# Patient Record
Sex: FEMALE | Race: BLACK OR AFRICAN AMERICAN | Employment: OTHER | URBAN - METROPOLITAN AREA
[De-identification: names, ages, dates, MRNs, and addresses within clinical notes are randomized per-mention and may not be internally consistent; named-entity substitution may affect disease eponyms.]

---

## 2017-06-30 ENCOUNTER — TELEPHONE (OUTPATIENT)
Dept: FAMILY MEDICINE CLINIC | Facility: CLINIC | Age: 72
End: 2017-06-30

## 2017-08-09 ENCOUNTER — HOSPITAL ENCOUNTER (OUTPATIENT)
Dept: LAB | Age: 72
Discharge: HOME | End: 2017-08-09
Payer: MEDICARE

## 2017-08-09 ENCOUNTER — OFFICE VISIT (OUTPATIENT)
Dept: FAMILY MEDICINE CLINIC | Facility: CLINIC | Age: 72
End: 2017-08-09

## 2017-08-09 VITALS
HEIGHT: 66 IN | WEIGHT: 195 LBS | DIASTOLIC BLOOD PRESSURE: 104 MMHG | SYSTOLIC BLOOD PRESSURE: 179 MMHG | BODY MASS INDEX: 31.34 KG/M2 | RESPIRATION RATE: 15 BRPM | OXYGEN SATURATION: 99 % | TEMPERATURE: 96.9 F | HEART RATE: 72 BPM

## 2017-08-09 DIAGNOSIS — N18.30 KIDNEY DISEASE, CHRONIC, STAGE III (GFR 30-59 ML/MIN) (HCC): ICD-10-CM

## 2017-08-09 DIAGNOSIS — Z71.89 ADVANCED CARE PLANNING/COUNSELING DISCUSSION: ICD-10-CM

## 2017-08-09 DIAGNOSIS — Z13.5 SCREENING FOR GLAUCOMA: ICD-10-CM

## 2017-08-09 DIAGNOSIS — R73.09 ELEVATED HEMOGLOBIN A1C: ICD-10-CM

## 2017-08-09 DIAGNOSIS — Z00.00 MEDICARE ANNUAL WELLNESS VISIT, SUBSEQUENT: Primary | ICD-10-CM

## 2017-08-09 DIAGNOSIS — Z23 ENCOUNTER FOR IMMUNIZATION: ICD-10-CM

## 2017-08-09 DIAGNOSIS — G62.9 NEUROPATHY: ICD-10-CM

## 2017-08-09 DIAGNOSIS — I10 BENIGN ESSENTIAL HTN: ICD-10-CM

## 2017-08-09 DIAGNOSIS — Z13.31 SCREENING FOR DEPRESSION: ICD-10-CM

## 2017-08-09 LAB
ALBUMIN SERPL BCP-MCNC: 4.2 G/DL (ref 3.4–5)
ALBUMIN/GLOB SERPL: 1.1 {RATIO} (ref 0.8–1.7)
ALP SERPL-CCNC: 107 U/L (ref 45–117)
ALT SERPL-CCNC: 29 U/L (ref 13–56)
ANION GAP BLD CALC-SCNC: 9 MMOL/L (ref 3–18)
AST SERPL W P-5'-P-CCNC: 32 U/L (ref 15–37)
BASOPHILS # BLD AUTO: 0 K/UL (ref 0–0.06)
BASOPHILS # BLD: 0 % (ref 0–2)
BILIRUB SERPL-MCNC: 0.6 MG/DL (ref 0.2–1)
BUN SERPL-MCNC: 14 MG/DL (ref 7–18)
BUN/CREAT SERPL: 12 (ref 12–20)
CALCIUM SERPL-MCNC: 9.5 MG/DL (ref 8.5–10.1)
CHLORIDE SERPL-SCNC: 106 MMOL/L (ref 100–108)
CHOLEST SERPL-MCNC: 219 MG/DL
CO2 SERPL-SCNC: 25 MMOL/L (ref 21–32)
CREAT SERPL-MCNC: 1.19 MG/DL (ref 0.6–1.3)
DIFFERENTIAL METHOD BLD: ABNORMAL
EOSINOPHIL # BLD: 0.1 K/UL (ref 0–0.4)
EOSINOPHIL NFR BLD: 1 % (ref 0–5)
ERYTHROCYTE [DISTWIDTH] IN BLOOD BY AUTOMATED COUNT: 16.3 % (ref 11.6–14.5)
EST. AVERAGE GLUCOSE BLD GHB EST-MCNC: 126 MG/DL
GLOBULIN SER CALC-MCNC: 3.7 G/DL (ref 2–4)
GLUCOSE SERPL-MCNC: 97 MG/DL (ref 74–99)
HBA1C MFR BLD: 6 % (ref 4.2–5.6)
HCT VFR BLD AUTO: 40.2 % (ref 35–45)
HDLC SERPL-MCNC: 80 MG/DL (ref 40–60)
HDLC SERPL: 2.7 {RATIO} (ref 0–5)
HGB BLD-MCNC: 12.5 G/DL (ref 12–16)
LDLC SERPL CALC-MCNC: 126 MG/DL (ref 0–100)
LIPID PROFILE,FLP: ABNORMAL
LYMPHOCYTES # BLD AUTO: 34 % (ref 21–52)
LYMPHOCYTES # BLD: 3.1 K/UL (ref 0.9–3.6)
MCH RBC QN AUTO: 26.4 PG (ref 24–34)
MCHC RBC AUTO-ENTMCNC: 31.1 G/DL (ref 31–37)
MCV RBC AUTO: 85 FL (ref 74–97)
MONOCYTES # BLD: 0.5 K/UL (ref 0.05–1.2)
MONOCYTES NFR BLD AUTO: 6 % (ref 3–10)
NEUTS SEG # BLD: 5.4 K/UL (ref 1.8–8)
NEUTS SEG NFR BLD AUTO: 59 % (ref 40–73)
PLATELET # BLD AUTO: 203 K/UL (ref 135–420)
PMV BLD AUTO: 12.7 FL (ref 9.2–11.8)
POTASSIUM SERPL-SCNC: 4.2 MMOL/L (ref 3.5–5.5)
PROT SERPL-MCNC: 7.9 G/DL (ref 6.4–8.2)
RBC # BLD AUTO: 4.73 M/UL (ref 4.2–5.3)
SODIUM SERPL-SCNC: 140 MMOL/L (ref 136–145)
T4 FREE SERPL-MCNC: 1.2 NG/DL (ref 0.7–1.5)
TRIGL SERPL-MCNC: 65 MG/DL (ref ?–150)
TSH SERPL DL<=0.05 MIU/L-ACNC: 1.48 UIU/ML (ref 0.36–3.74)
VLDLC SERPL CALC-MCNC: 13 MG/DL
WBC # BLD AUTO: 9.1 K/UL (ref 4.6–13.2)

## 2017-08-09 PROCEDURE — 36415 COLL VENOUS BLD VENIPUNCTURE: CPT | Performed by: INTERNAL MEDICINE

## 2017-08-09 PROCEDURE — 83036 HEMOGLOBIN GLYCOSYLATED A1C: CPT | Performed by: INTERNAL MEDICINE

## 2017-08-09 PROCEDURE — 85025 COMPLETE CBC W/AUTO DIFF WBC: CPT | Performed by: INTERNAL MEDICINE

## 2017-08-09 PROCEDURE — 82306 VITAMIN D 25 HYDROXY: CPT | Performed by: INTERNAL MEDICINE

## 2017-08-09 PROCEDURE — 84439 ASSAY OF FREE THYROXINE: CPT | Performed by: INTERNAL MEDICINE

## 2017-08-09 PROCEDURE — 84443 ASSAY THYROID STIM HORMONE: CPT | Performed by: INTERNAL MEDICINE

## 2017-08-09 PROCEDURE — 80053 COMPREHEN METABOLIC PANEL: CPT | Performed by: INTERNAL MEDICINE

## 2017-08-09 PROCEDURE — 80061 LIPID PANEL: CPT | Performed by: INTERNAL MEDICINE

## 2017-08-09 RX ORDER — LOSARTAN POTASSIUM 100 MG/1
100 TABLET ORAL DAILY
Qty: 90 TAB | Refills: 3 | Status: SHIPPED | OUTPATIENT
Start: 2017-08-09 | End: 2019-03-12 | Stop reason: SDUPTHER

## 2017-08-09 RX ORDER — METOPROLOL SUCCINATE 25 MG/1
25 TABLET, EXTENDED RELEASE ORAL DAILY
Qty: 90 TAB | Refills: 3 | Status: SHIPPED | OUTPATIENT
Start: 2017-08-09 | End: 2017-12-27

## 2017-08-09 RX ORDER — PREGABALIN 75 MG/1
75 CAPSULE ORAL 2 TIMES DAILY
Qty: 180 CAP | Refills: 3 | Status: SHIPPED | OUTPATIENT
Start: 2017-08-09 | End: 2019-03-11

## 2017-08-09 NOTE — PATIENT INSTRUCTIONS
Schedule of Personalized Health Plan  (Provide Copy to Patient)  The best way to stay healthy is to live a healthy lifestyle. A healthy lifestyle includes regular exercise, eating a well-balanced diet, keeping a healthy weight and not smoking. Regular physical exams and screening tests are another important way to take care of yourself. Preventive exams provided by health care providers can find health problems early when treatment works best and can keep you from getting certain diseases or illnesses. Preventive services include exams, lab tests, screenings, shots, monitoring and information to help you take care of your own health. All people over 65 should have a pneumonia shot. Pneumonia shots are usually only needed once in a lifetime unless your doctor decides differently. All people over 65 should have a yearly flu shot. People over 65 are at medium to high risk for Hepatitis B. Three shots are needed for complete protection. In addition to your physical exam, some screening tests are recommended:    Bone mass measurement (dexa scan) is recommended every two years  Diabetes Mellitus screening is recommended every year. Glaucoma is an eye disease caused by high pressure in the eye. An eye exam is recommended every year. Cardiovascular screening tests that check your cholesterol and other blood fat (lipid) levels are recommended every five years. Colorectal Cancer screening tests help to find pre-cancerous polyps (growths in the colon) so they can be removed before they turn into cancer. Tests ordered for screening depend on your personal and family history risk factors.     Screening for Breast Cancer is recommended yearly with a mammogram.    Screening for Cervical Cancer is recommended every two years (annually for certain risk factors, such as previous history of STD or abnormal PAP in past 7 years), with a Pelvic Exam with PAP    Here is a list of your current Health Maintenance items with a due date:  Health Maintenance   Topic Date Due    ZOSTER VACCINE AGE 60>  10/17/2005    GLAUCOMA SCREENING Q2Y  12/17/2010    Pneumococcal 65+ Low/Medium Risk (2 of 2 - PPSV23) 12/30/2016    MEDICARE YEARLY EXAM  12/30/2016    INFLUENZA AGE 9 TO ADULT  08/01/2017    BREAST CANCER SCRN MAMMOGRAM  10/19/2018    DTaP/Tdap/Td series (2 - Td) 12/30/2025    COLONOSCOPY  01/13/2026    Hepatitis C Screening  Completed    OSTEOPOROSIS SCREENING (DEXA)  Completed

## 2017-08-09 NOTE — PROGRESS NOTES
Angelina Purvis is a 70 y.o. female and presents for annual Medicare Wellness Visit. Problem List: Reviewed with patient and discussed risk factors. Patient Active Problem List   Diagnosis Code    Benign essential HTN I10    Advance directive discussed with patient Z71.89    Obesity, Class I, BMI 30-34.9 E66.9    Sciatica of left side M54.32    Post herpetic neuralgia B02.29    Memory loss R41.3    Acute midline low back pain with left-sided sciatica M54.42    Pain of left hip joint M25.552    Osteopenia M85.80    Kidney disease, chronic, stage III (GFR 30-59 ml/min) N18.3    Elevated hemoglobin A1c R73.09       Current medical providers:  Patient Care Team:  Luis Kinsey MD as PCP - General (Internal Medicine)  Fadi Lewis MD (Surgery)    PSH: Reviewed with patient  Past Surgical History:   Procedure Laterality Date    APPENDECTOMY      CHOLECYSTOENTEROSTOMY      HX TONSIL AND ADENOIDECTOMY          SH: Reviewed with patient  Social History   Substance Use Topics    Smoking status: Never Smoker    Smokeless tobacco: Never Used    Alcohol use Yes      Comment: socially       FH: Reviewed with patient  Family History   Problem Relation Age of Onset    Hypertension Sister     Breast Cancer Sister        Medications/Allergies: Reviewed with patient  Current Outpatient Prescriptions on File Prior to Visit   Medication Sig Dispense Refill    chlorthalidone (HYGROTEN) 25 mg tablet Take 1 Tab by mouth daily. (Patient taking differently: Take 25 mg by mouth every other day.) 90 Tab 3    multivitamin (ONE A DAY) tablet Take 1 Tab by mouth daily.  omega-3 fatty acids-vitamin e (FISH OIL) 1,000 mg cap Take 1 Cap by mouth. No current facility-administered medications on file prior to visit.        Allergies   Allergen Reactions    Amlodipine Rash       Objective:  Visit Vitals    BP (!) 179/104 (BP 1 Location: Left arm, BP Patient Position: Sitting)  Comment: manual    Pulse 72    Temp 96.9 °F (36.1 °C) (Oral)    Resp 15    Ht 5' 6\" (1.676 m)    Wt 195 lb (88.5 kg)    SpO2 99%    BMI 31.47 kg/m2    Body mass index is 31.47 kg/(m^2). Assessment of cognitive impairment: Alert and oriented x 3  Depression Screen:   PHQ over the last two weeks 8/9/2017   Little interest or pleasure in doing things Not at all   Feeling down, depressed or hopeless Nearly every day   Total Score PHQ 2 3     Fall Risk Assessment:    Fall Risk Assessment, last 12 mths 8/9/2017   Able to walk? Yes   Fall in past 12 months? Yes   Fall with injury? Yes   Number of falls in past 12 months 4   Fall Risk Score 5     Functional Ability:   Does the patient exhibit a steady gait? yes   How long did it take the patient to get up and walk from a sitting position? 7 seconds   Is the patient self reliant?  (ie can do own laundry, meals, household chores)  yes   Does the patient handle his/her own medications? yes   Does the patient handle his/her own money? yes   Is the patients home safe (ie good lighting, handrails on stairs and bath, etc.)? yes   Did you notice or did patient express any hearing difficulties? no   Did you notice of did patient express any vision difficulties? Yes. Will see eye dr   Were distance and reading eye charts used?   yes     Advance Care Planning:   Patient was offered the opportunity to discuss advance care planning:  yes     Does patient have an Advance Directive:  yes   If no, did you provide information on Caring Connections?  no     Plan:    Orders Placed This Encounter    ADVANCE CARE PLANNING FIRST 30 MINS    Depression Screen Annual    CBC WITH AUTOMATED DIFF    LIPID PANEL    METABOLIC PANEL, COMPREHENSIVE    TSH 3RD GENERATION    T4, FREE    HEMOGLOBIN A1C WITH EAG    VITAMIN D, 25 HYDROXY    REFERRAL TO OPHTHALMOLOGY    REFERRAL TO NEUROLOGY    pneumococcal 23-valent (PNEUMOVAX 23) 25 mcg/0.5 mL injection    varicella zoster vacine live (VARICELLA-ZOSTER VACINE LIVE) 19,400 unit/0.65 mL susr injection    losartan (COZAAR) 100 mg tablet    metoprolol succinate (TOPROL-XL) 25 mg XL tablet    pregabalin (LYRICA) 75 mg capsule     Health Maintenance   Topic Date Due    ZOSTER VACCINE AGE 60>  10/17/2005    GLAUCOMA SCREENING Q2Y  2010    Pneumococcal 65+ Low/Medium Risk (2 of 2 - PPSV23) 2016    MEDICARE YEARLY EXAM  2016    INFLUENZA AGE 9 TO ADULT  2017    BREAST CANCER SCRN MAMMOGRAM  10/19/2018    DTaP/Tdap/Td series (2 - Td) 2025    COLONOSCOPY  2026    Hepatitis C Screening  Completed    OSTEOPOROSIS SCREENING (DEXA)  Completed     Time spent counseling pt on advanced care plannin minutes    *Patient verbalized understanding and agreement with the plan. A copy of the After Visit Summary with personalized health plan was given to the patient today. Follow-up Disposition:  Return in about 1 week (around 2017) for Follow up hypertension. Betty Jovel.  [address] eL MD - Internal Medicine  2017, 2:33 PM  [facility]  [address] [place], [address]  Phone [phone]  Fax [phone]

## 2017-08-09 NOTE — PROGRESS NOTES
Internal Medicine Progress Note    Today's Date:  2017   Patient:  Glenna Pollack  Patient :  1945    Subjective:     Chief Complaint   Patient presents with    Hypertension    Back Pain    Hip Pain    Obesity    Annual Wellness Visit      Hypertension   This is a chronic problem. BP is not at goal. Pt is on losartan and chlorthalidone. She is compliant with these medications. Back and hip pain/Neuropathy  This is a chronic problem. This is not at goal. This has been present for one year. Xrays were done which showed arthritis. Neurontin is not effective. Chronic kidney disease  This is a chronic problem. This is not at goal. Creatinine was last checked on 10/2016. GFR is 57. Past Medical History:   Diagnosis Date    Acute midline low back pain with left-sided sciatica 10/5/2016    Benign essential HTN 2015    CKD (chronic kidney disease), stage III 2015    Elevated hemoglobin A1c 2016    Kidney disease, chronic, stage III (GFR 30-59 ml/min) 2016    Memory loss 2016    Obesity, Class I, BMI 30-34.9 2015    Osteopenia 10/19/2016    Pain of left hip joint 10/5/2016    Post herpetic neuralgia 2015    Sciatica of left side 2015     Past Surgical History:   Procedure Laterality Date    APPENDECTOMY      CHOLECYSTOENTEROSTOMY      HX TONSIL AND ADENOIDECTOMY        reports that she has never smoked. She has never used smokeless tobacco. She reports that she drinks alcohol. She reports that she does not use illicit drugs.   Family History   Problem Relation Age of Onset    Hypertension Sister     Breast Cancer Sister      Allergies   Allergen Reactions    Amlodipine Rash     Review of Systems   Positives in bold  CV:      chest pain, palpitations  PULM:  SOB, wheezing, cough, sputum production    Current Outpatient Meds and Allergies     Current Outpatient Prescriptions on File Prior to Visit   Medication Sig Dispense Refill    chlorthalidone (HYGROTEN) 25 mg tablet Take 1 Tab by mouth daily. (Patient taking differently: Take 25 mg by mouth every other day.) 90 Tab 3    multivitamin (ONE A DAY) tablet Take 1 Tab by mouth daily.  omega-3 fatty acids-vitamin e (FISH OIL) 1,000 mg cap Take 1 Cap by mouth. No current facility-administered medications on file prior to visit. Allergies   Allergen Reactions    Amlodipine Rash     Objective:     VS:    Visit Vitals    BP (!) 179/104 (BP 1 Location: Left arm, BP Patient Position: Sitting)  Comment: manual    Pulse 72    Temp 96.9 °F (36.1 °C) (Oral)    Resp 15    Ht 5' 6\" (1.676 m)    Wt 195 lb (88.5 kg)    SpO2 99%    BMI 31.47 kg/m2     General:   Well-nourished, well-groomed, pleasant, alert, in no acute distress  Head:  Normocephalic, atraumatic  Ears:  External ears WNL  Nose:  External nares WNL  Psych:  No pressured speech, no abnormal thought content    No visits with results within 3 Month(s) from this visit. Latest known visit with results is:    Hospital Outpatient Visit on 10/12/2016   Component Date Value Ref Range Status    WBC 10/12/2016 8.9  4.6 - 13.2 K/uL Final    RBC 10/12/2016 4.62  4.20 - 5.30 M/uL Final    HGB 10/12/2016 13.3  12.0 - 16.0 g/dL Final    HCT 10/12/2016 41.8  35.0 - 45.0 % Final    MCV 10/12/2016 90.5  74.0 - 97.0 FL Final    MCH 10/12/2016 28.8  24.0 - 34.0 PG Final    MCHC 10/12/2016 31.8  31.0 - 37.0 g/dL Final    RDW 10/12/2016 14.8* 11.6 - 14.5 % Final    PLATELET 30/85/2445 254  135 - 420 K/uL Final    MPV 10/12/2016 13.1* 9.2 - 11.8 FL Final    NEUTROPHILS 10/12/2016 64  40 - 73 % Final    LYMPHOCYTES 10/12/2016 29  21 - 52 % Final    MONOCYTES 10/12/2016 6  3 - 10 % Final    EOSINOPHILS 10/12/2016 1  0 - 5 % Final    BASOPHILS 10/12/2016 0  0 - 2 % Final    ABS. NEUTROPHILS 10/12/2016 5.6  1.8 - 8.0 K/UL Final    ABS. LYMPHOCYTES 10/12/2016 2.6  0.9 - 3.6 K/UL Final    ABS.  MONOCYTES 10/12/2016 0.5  0.05 - 1.2 K/UL Final    ABS. EOSINOPHILS 10/12/2016 0.1  0.0 - 0.4 K/UL Final    ABS. BASOPHILS 10/12/2016 0.0  0.0 - 0.06 K/UL Final    DF 10/12/2016 AUTOMATED    Final    T4, Free 10/12/2016 1.2  0.7 - 1.5 NG/DL Final    LIPID PROFILE 10/12/2016        Final    Cholesterol, total 10/12/2016 180  <200 MG/DL Final    Triglyceride 10/12/2016 73  <150 MG/DL Final    HDL Cholesterol 10/12/2016 66* 40 - 60 MG/DL Final    LDL, calculated 10/12/2016 99.4  0 - 100 MG/DL Final    VLDL, calculated 10/12/2016 14.6  MG/DL Final    CHOL/HDL Ratio 10/12/2016 2.7  0 - 5.0   Final    Sodium 10/12/2016 139  136 - 145 mmol/L Final    Potassium 10/12/2016 4.0  3.5 - 5.5 mmol/L Final    Chloride 10/12/2016 103  100 - 108 mmol/L Final    CO2 10/12/2016 28  21 - 32 mmol/L Final    Anion gap 10/12/2016 8  3.0 - 18 mmol/L Final    Glucose 10/12/2016 92  74 - 99 mg/dL Final    BUN 10/12/2016 18  7.0 - 18 MG/DL Final    Creatinine 10/12/2016 1.14  0.6 - 1.3 MG/DL Final    BUN/Creatinine ratio 10/12/2016 16  12 - 20   Final    GFR est AA 10/12/2016 57* >60 ml/min/1.73m2 Final    GFR est non-AA 10/12/2016 47* >60 ml/min/1.73m2 Final    Comment: (NOTE)  Estimated GFR is calculated using the Modification of Diet in Renal   Disease (MDRD) Study equation, reported for both  Americans   (GFRAA) and non- Americans (GFRNA), and normalized to 1.73m2   body surface area. The physician must decide which value applies to   the patient. The MDRD study equation should only be used in   individuals age 25 or older. It has not been validated for the   following: pregnant women, patients with serious comorbid conditions,   or on certain medications, or persons with extremes of body size,   muscle mass, or nutritional status.       Calcium 10/12/2016 9.8  8.5 - 10.1 MG/DL Final    Bilirubin, total 10/12/2016 0.8  0.2 - 1.0 MG/DL Final    ALT (SGPT) 10/12/2016 25  13 - 56 U/L Final    AST (SGOT) 10/12/2016 27  15 - 37 U/L Final    Alk. phosphatase 10/12/2016 95  45 - 117 U/L Final    Protein, total 10/12/2016 8.1  6.4 - 8.2 g/dL Final    Albumin 10/12/2016 4.2  3.4 - 5.0 g/dL Final    Globulin 10/12/2016 3.9  2.0 - 4.0 g/dL Final    A-G Ratio 10/12/2016 1.1  0.8 - 1.7   Final    TSH 10/12/2016 1.07  0.36 - 3.74 uIU/mL Final    Hemoglobin A1c 10/12/2016 5.7* 4.2 - 5.6 % Final    Comment: (NOTE)  HbA1C Interpretive Ranges  <5.7              Normal  5.7 - 6.4         Consider Prediabetes  >6.5              Consider Diabetes      Est. average glucose 10/12/2016 117  mg/dL Final    Comment: (NOTE)  The eAG should be interpreted with patient characteristics in mind   since ethnicity, interindividual differences, red cell lifespan,   variation in rates of glycation, etc. may affect the validity of the   calculation.  Vitamin D 25-Hydroxy 10/12/2016 37.1  30 - 100 ng/mL Final    Comment: (NOTE)  Deficiency               <20 ng/mL  Insufficiency          20-30 ng/mL  Sufficient             ng/mL  Possible toxicity       >100 ng/mL    The Method used is Siemens Advia Centaur currently standardized to a   Center of Disease Control and Prevention (CDC) certified reference   [address]. Samples containing fluorescein dye can produce falsely   elevated values when tested with the ADVIA Centaur Vitamin D Assay. It is recommended that results in the toxic range, >100 ng/mL, be   retested 72 hours post fluorescein exposure. Assessment/Plan & Orders:         ICD-10-CM ICD-9-CM    1. Medicare annual wellness visit, subsequent Z00.00 V70.0    2.  Benign essential HTN I10 401.1 losartan (COZAAR) 100 mg tablet      CBC WITH AUTOMATED DIFF      LIPID PANEL      METABOLIC PANEL, COMPREHENSIVE      TSH 3RD GENERATION      T4, FREE      metoprolol succinate (TOPROL-XL) 25 mg XL tablet      CBC WITH AUTOMATED DIFF      LIPID PANEL      METABOLIC PANEL, COMPREHENSIVE      TSH 3RD GENERATION      T4, FREE   3. Elevated hemoglobin A1c R73.09 790.29 HEMOGLOBIN A1C WITH EAG      HEMOGLOBIN A1C WITH EAG   4. Kidney disease, chronic, stage III (GFR 30-59 ml/min) N18.3 585.3 VITAMIN D, 25 HYDROXY      VITAMIN D, 25 HYDROXY   5. Neuropathy (HCC) G62.9 355.9 pregabalin (LYRICA) 75 mg capsule      REFERRAL TO NEUROLOGY   6. Screening for glaucoma Z13.5 V80.1 REFERRAL TO OPHTHALMOLOGY   7. Encounter for immunization Z23 V03.89 pneumococcal 23-valent (PNEUMOVAX 23) 25 mcg/0.5 mL injection      varicella zoster vacine live (VARICELLA-ZOSTER VACINE LIVE) 19,400 unit/0.65 mL susr injection   8. Advanced care planning/counseling discussion Z71.89 V65.49 ADVANCE CARE PLANNING FIRST 30 MINS   9. Screening for depression Z13.89 V79.0 Lamont Rivera      Healthy lifestyle has been encouraged including avoidance of tobacco, limiting or avoiding alcohol intake, heart healthy diet which is low in cholesterol and saturated fat and contains fresh fruits, vegetables and whole grains and fiber, regular exercise with goals of 20-30 minutes 3-5 days weekly and maintaining an optimal BMI. Pt to continue chlorthalidone and losartan  Pt states that colonoscopy is up to date. Done 9 yrs ago. She had FOBT testing done. She is unsure of the result. Pt to get eye exam. Form given    Follow-up Disposition:  Return in about 1 week (around 8/16/2017) for Follow up hypertension. *Patient verbalized understanding and agreement with the plan. Patient was given an after-visit summary. Mnadie Couch.  [address], MD - Internal Medicine  8/9/2017, 8:17 AM  [facility]  [address], [address]  Phone [phone]  Fax [phone]

## 2017-08-09 NOTE — ACP (ADVANCE CARE PLANNING)
Pt would like to appoint her niece, Marycarmen Baker, as her medical decision maker in the event that she can no longer do so. Phone number is [phone]. Her secondary person is her brother, Zeus Sylvester. Phone number is [phone]. If her death is imminent and medical treatment will not help her recover, pt does not want life prolonging measures. If her condition makes her unaware of herself or her surroundings and she cannot interact with others, pt does not want life prolonging measures. If there is a chance for recovery, she would like to be kept alive for at least a week. Advance directive scanned in the chart under media.

## 2017-08-09 NOTE — MR AVS SNAPSHOT
Visit Information Date & Time Provider Department Dept. Phone Encounter #  
 8/9/2017  2:00 PM Manuelito Alcaraz MD [facility] [phone] [id number] Follow-up Instructions Return in about 1 week (around 8/16/2017) for Follow up hypertension. Upcoming Health Maintenance Date Due ZOSTER VACCINE AGE 60> 10/17/2005 GLAUCOMA SCREENING Q2Y 12/17/2010 Pneumococcal 65+ Low/Medium Risk (2 of 2 - PPSV23) 12/30/2016 MEDICARE YEARLY EXAM 12/30/2016 INFLUENZA AGE 9 TO ADULT 8/1/2017 BREAST CANCER SCRN MAMMOGRAM 10/19/2018 DTaP/Tdap/Td series (2 - Td) 12/30/2025 COLONOSCOPY 1/13/2026 Allergies as of 8/9/2017  Review Complete On: 8/9/2017 By: Manuelito Alcaraz MD  
  
 Severity Noted Reaction Type Reactions Amlodipine  10/05/2016    Rash Current Immunizations  Never Reviewed Name Date Influenza Vaccine (Quad) PF 10/5/2016 Pneumococcal Conjugate (PCV-13) 12/30/2015  8:30 AM  
 Tdap 12/30/2015  8:32 AM  
  
 Not reviewed this visit You Were Diagnosed With   
  
 Codes Comments Medicare annual wellness visit, subsequent    -  Primary ICD-10-CM: Z00.00 ICD-9-CM: V70.0 Benign essential HTN     ICD-10-CM: I10 
ICD-9-CM: 401.1 Elevated hemoglobin A1c     ICD-10-CM: R73.09 
ICD-9-CM: 790.29 Kidney disease, chronic, stage III (GFR 30-59 ml/min)     ICD-10-CM: N18.3 ICD-9-CM: 439. 3 Neuropathy ([address]     ICD-10-CM: G62.9 ICD-9-CM: 355.9 Screening for glaucoma     ICD-10-CM: Z13.5 ICD-9-CM: V80.1 Encounter for immunization     ICD-10-CM: T59 ICD-9-CM: V03.89 Vitals BP Pulse Temp Resp Height(growth percentile) Weight(growth percentile) (!) 179/104 (BP 1 Location: Left arm, BP Patient Position: Sitting) 72 96.9 °F (36.1 °C) (Oral) 15 5' 6\" (1.676 m) 195 lb (88.5 kg) SpO2 BMI OB Status Smoking Status 99% 31.47 kg/m2 Hysterectomy Never Smoker Vitals History BMI and BSA Data Body Mass Index Body Surface Area  
 31.47 kg/m 2 2.03 m 2 Preferred Pharmacy Pharmacy Name Phone [address], [address] [phone] Your Updated Medication List  
  
   
This list is accurate as of: 17  3:01 PM.  Always use your most recent med list.  
  
  
  
  
 chlorthalidone 25 mg tablet Commonly known as:  Kori [place] Take 1 Tab by mouth daily. FISH OIL 1,000 mg Cap Generic drug:  omega-3 fatty acids-vitamin e Take 1 Cap by mouth.  
  
 losartan 100 mg tablet Commonly known as:  COZAAR Take 1 Tab by mouth daily. metoprolol succinate 25 mg XL tablet Commonly known as:  TOPROL-XL Take 1 Tab by mouth daily. multivitamin tablet Commonly known as:  ONE A DAY Take 1 Tab by mouth daily. pneumococcal 23-valent 25 mcg/0.5 mL injection Commonly known as:  PNEUMOVAX 23  
0.5 mL by IntraMUSCular route once for 1 dose. pregabalin 75 mg capsule Commonly known as:  Arianna Saint Take 1 Cap by mouth two (2) times a day. Max Daily Amount: 150 mg.  
  
 varicella zoster vacine live 19,400 unit/0.65 mL Susr injection Commonly known as:  varicella-zoster vacine live 1 Vial by SubCUTAneous route once for 1 dose. Prescriptions Printed Refills  
 pneumococcal 23-valent (PNEUMOVAX 23) 25 mcg/0.5 mL injection 0 Si.5 mL by IntraMUSCular route once for 1 dose. Class: Print Route: IntraMUSCular  
 varicella zoster vacine live (VARICELLA-ZOSTER VACINE LIVE) 19,400 unit/0.65 mL susr injection 0 Si Vial by SubCUTAneous route once for 1 dose. Class: Print Route: SubCUTAneous  
 pregabalin (LYRICA) 75 mg capsule 3 Sig: Take 1 Cap by mouth two (2) times a day. Max Daily Amount: 150 mg.  
 Class: Print Route: Oral  
  
Prescriptions Sent to Pharmacy Refills  
 losartan (COZAAR) 100 mg tablet 3 Sig: Take 1 Tab by mouth daily.   
 Class: Normal  
 Pharmacy: [address] Ph #: [phone] Route: Oral  
 metoprolol succinate (TOPROL-XL) 25 mg XL tablet 3 Sig: Take 1 Tab by mouth daily. Class: Normal  
 Pharmacy: [facility] [address], [facility] [address] Ph #: [phone] Route: Oral  
  
We Performed the Following REFERRAL TO NEUROLOGY [EWG93 Custom] REFERRAL TO OPHTHALMOLOGY [REF57 Custom] Comments:  
 Please evaluate for Glaucoma Screening. Follow-up Instructions Return in about 1 week (around 8/16/2017) for Follow up hypertension. To-Do List   
 08/09/2017 Lab:  HEMOGLOBIN A1C WITH EAG   
  
 08/09/2017 Lab:  T4, FREE   
  
 08/09/2017 Lab:  TSH 3RD GENERATION   
  
 08/09/2017 Lab:  VITAMIN D, 25 HYDROXY   
  
 08/12/2017 Lab:  CBC WITH AUTOMATED DIFF   
  
 08/12/2017 Lab:  LIPID PANEL   
  
 08/12/2017 Lab:  METABOLIC PANEL, COMPREHENSIVE Referral Information Referral ID Referred By Referred To  
  
 [id number] AllenIrais MD   
   [address] [place] [address] Phone: [phone] Fax: [phone] Visits Status Start Date End Date 1 New Request 8/9/17 8/9/18 If your referral has a status of pending review or denied, additional information will be sent to support the outcome of this decision. Referral ID Referred By Referred To  
 [id number] Hany Carrion MD  
   [id number] [facility] [address] [address] Phone: [phone] Fax: [phone] Visits Status Start Date End Date 1 New Request 8/9/17 8/9/18 If your referral has a status of pending review or denied, additional information will be sent to support the outcome of this decision. Patient Instructions Schedule of Personalized Health Plan (Provide Copy to Patient) The best way to stay healthy is to live a healthy lifestyle. A healthy lifestyle includes regular exercise, eating a well-balanced diet, keeping a healthy weight and not smoking. Regular physical exams and screening tests are another important way to take care of yourself. Preventive exams provided by health care providers can find health problems early when treatment works best and can keep you from getting certain diseases or illnesses. Preventive services include exams, lab tests, screenings, shots, monitoring and information to help you take care of your own health. All people over 65 should have a pneumonia shot. Pneumonia shots are usually only needed once in a lifetime unless your doctor decides differently. All people over 65 should have a yearly flu shot. People over 65 are at medium to high risk for Hepatitis B. Three shots are needed for complete protection. In addition to your physical exam, some screening tests are recommended: 
 
Bone mass measurement (dexa scan) is recommended every two years Diabetes Mellitus screening is recommended every year. Glaucoma is an eye disease caused by high pressure in the eye. An eye exam is recommended every year. Cardiovascular screening tests that check your cholesterol and other blood fat (lipid) levels are recommended every five years. Colorectal Cancer screening tests help to find pre-cancerous polyps (growths in the colon) so they can be removed before they turn into cancer. Tests ordered for screening depend on your personal and family history risk factors. Screening for Breast Cancer is recommended yearly with a mammogram. 
 
Screening for Cervical Cancer is recommended every two years (annually for certain risk factors, such as previous history of STD or abnormal PAP in past 7 years), with a Pelvic Exam with PAP Here is a list of your current Health Maintenance items with a due date: 
Health Maintenance Topic Date Due  
  ZOSTER VACCINE AGE 60>  10/17/2005  GLAUCOMA SCREENING Q2Y  12/17/2010  Pneumococcal 65+ Low/Medium Risk (2 of 2 - PPSV23) 12/30/2016  MEDICARE YEARLY EXAM  12/30/2016  INFLUENZA AGE 9 TO ADULT  08/01/2017  BREAST CANCER SCRN MAMMOGRAM  10/19/2018  DTaP/Tdap/Td series (2 - Td) 12/30/2025  COLONOSCOPY  01/13/2026  Hepatitis C Screening  Completed  OSTEOPOROSIS SCREENING (DEXA)  Completed Introducing [facility]! Dear Mabel Hatch: Thank you for requesting a [employer] account. Our records indicate that you already have an active [employer] account. You can access your account anytime at [email or web address] Did you know that you can access your hospital and ER discharge instructions at any time in [employer]? You can also review all of your test results from your hospital stay or ER visit. Additional Information If you have questions, please visit the Frequently Asked Questions section of the [employer] website at [email or web address] Remember, [employer] is NOT to be used for urgent needs. For medical emergencies, dial 911. Now available from your iPhone and Android! Please provide this summary of care documentation to your next provider. Your primary care clinician is listed as Salty Burrell. If you have any questions after today's visit, please call [phone].

## 2017-08-09 NOTE — PROGRESS NOTES
Messi Healy is a 70 y.o. female presents today for follow up on her Hypertension, Back Pain, Hip Pain and Medicare Wellness. She would also like to discuss her sciatic pain. Patient is Fasting. Pt is in Room # 3        1. Have you been to the ER, urgent care clinic since your last visit? Hospitalized since your last visit? No    2. Have you seen or consulted any other health care providers outside of the [address] [place] [address] since your last visit? Include any pap smears or colon screening. No      reviewed.

## 2017-08-10 ENCOUNTER — TELEPHONE (OUTPATIENT)
Dept: FAMILY MEDICINE CLINIC | Facility: CLINIC | Age: 72
End: 2017-08-10

## 2017-08-10 LAB — 25(OH)D3 SERPL-MCNC: 37 NG/ML (ref 30–100)

## 2017-08-23 ENCOUNTER — OFFICE VISIT (OUTPATIENT)
Dept: FAMILY MEDICINE CLINIC | Facility: CLINIC | Age: 72
End: 2017-08-23

## 2017-08-23 VITALS
RESPIRATION RATE: 16 BRPM | HEIGHT: 66 IN | OXYGEN SATURATION: 98 % | BODY MASS INDEX: 30.92 KG/M2 | TEMPERATURE: 96.3 F | WEIGHT: 192.4 LBS | SYSTOLIC BLOOD PRESSURE: 132 MMHG | DIASTOLIC BLOOD PRESSURE: 74 MMHG | HEART RATE: 77 BPM

## 2017-08-23 DIAGNOSIS — I10 WHITE COAT SYNDROME WITH HYPERTENSION: ICD-10-CM

## 2017-08-23 DIAGNOSIS — E66.9 OBESITY, CLASS I, BMI 30-34.9: ICD-10-CM

## 2017-08-23 DIAGNOSIS — N18.30 KIDNEY DISEASE, CHRONIC, STAGE III (GFR 30-59 ML/MIN) (HCC): ICD-10-CM

## 2017-08-23 DIAGNOSIS — Z23 ENCOUNTER FOR IMMUNIZATION: ICD-10-CM

## 2017-08-23 DIAGNOSIS — R73.03 PREDIABETES: ICD-10-CM

## 2017-08-23 DIAGNOSIS — I10 BENIGN ESSENTIAL HTN: Primary | ICD-10-CM

## 2017-08-23 NOTE — PATIENT INSTRUCTIONS
Vaccine Information Statement    Influenza (Flu) Vaccine (Inactivated or Recombinant): What you need to know    Many Vaccine Information Statements are available in [place] and other languages. See www.immunize.org/vis  Hojas de Información Sobre Vacunas están disponibles en Español y en muchos otros idiomas. Visite www.immunize.org/vis    1. Why get vaccinated? Influenza (flu) is a contagious disease that spreads around the [place] every year, usually between October and May. Flu is caused by influenza viruses, and is spread mainly by coughing, sneezing, and close contact. Anyone can get flu. Flu strikes suddenly and can last several days. Symptoms vary by age, but can include:   fever/chills   sore throat   muscle aches   fatigue   cough   headache    runny or stuffy nose    Flu can also lead to pneumonia and blood infections, and cause diarrhea and seizures in children. If you have a medical condition, such as heart or lung disease, flu can make it worse. Flu is more dangerous for some people. Infants and young children, people 72years of age and older, pregnant women, and people with certain health conditions or a weakened immune system are at greatest risk. Each year thousands of people in the [facility] die from flu, and many more are hospitalized. Flu vaccine can:   keep you from getting flu,   make flu less severe if you do get it, and   keep you from spreading flu to your family and other people. 2. Inactivated and recombinant flu vaccines    A dose of flu vaccine is recommended every flu season. Children 6 months through 6years of age may need two doses during the same flu season. Everyone else needs only one dose each flu season.        Some inactivated flu vaccines contain a very small amount of a mercury-based preservative called thimerosal. Studies have not shown thimerosal in vaccines to be harmful, but flu vaccines that do not contain thimerosal are available. There is no live flu virus in flu shots. They cannot cause the flu. There are many flu viruses, and they are always changing. Each year a new flu vaccine is made to protect against three or four viruses that are likely to cause disease in the upcoming flu season. But even when the vaccine doesnt exactly match these viruses, it may still provide some protection    Flu vaccine cannot prevent:   flu that is caused by a virus not covered by the vaccine, or   illnesses that look like flu but are not. It takes about 2 weeks for protection to develop after vaccination, and protection lasts through the flu season. 3. Some people should not get this vaccine    Tell the person who is giving you the vaccine:     If you have any severe, life-threatening allergies. If you ever had a life-threatening allergic reaction after a dose of flu vaccine, or have a severe allergy to any part of this vaccine, you may be advised not to get vaccinated. Most, but not all, types of flu vaccine contain a small amount of egg protein.  If you ever had Guillain-Barré Syndrome (also called GBS). Some people with a history of GBS should not get this vaccine. This should be discussed with your doctor.  If you are not feeling well. It is usually okay to get flu vaccine when you have a mild illness, but you might be asked to come back when you feel better. 4. Risks of a vaccine reaction    With any medicine, including vaccines, there is a chance of reactions. These are usually mild and go away on their own, but serious reactions are also possible. Most people who get a flu shot do not have any problems with it.      Minor problems following a flu shot include:    soreness, redness, or swelling where the shot was given     hoarseness   sore, red or itchy eyes   cough   fever   aches   headache   itching   fatigue  If these problems occur, they usually begin soon after the shot and last 1 or 2 days. More serious problems following a flu shot can include the following:     There may be a small increased risk of Guillain-Barré Syndrome (GBS) after inactivated flu vaccine. This risk has been estimated at 1 or 2 additional cases per million people vaccinated. This is much lower than the risk of severe complications from flu, which can be prevented by flu vaccine.  Young children who get the flu shot along with pneumococcal vaccine (PCV13) and/or DTaP vaccine at the same time might be slightly more likely to have a seizure caused by fever. Ask your doctor for more information. Tell your doctor if a child who is getting flu vaccine has ever had a seizure. Problems that could happen after any injected vaccine:      People sometimes faint after a medical procedure, including vaccination. Sitting or lying down for about 15 minutes can help prevent fainting, and injuries caused by a fall. Tell your doctor if you feel dizzy, or have vision changes or ringing in the ears.  Some people get severe pain in the shoulder and have difficulty moving the arm where a shot was given. This happens very rarely.  Any medication can cause a severe allergic reaction. Such reactions from a vaccine are very rare, estimated at about 1 in a million doses, and would happen within a few minutes to a few hours after the vaccination. As with any medicine, there is a very remote chance of a vaccine causing a serious injury or death. The safety of vaccines is always being monitored. For more information, visit: www.cdc.gov/vaccinesafety/    5. What if there is a serious reaction? What should I look for?  Look for anything that concerns you, such as signs of a severe allergic reaction, very high fever, or unusual behavior.     Signs of a severe allergic reaction can include hives, swelling of the face and throat, difficulty breathing, a fast heartbeat, dizziness, and weakness  usually within a few minutes to a few hours after the vaccination. What should I do?  If you think it is a severe allergic reaction or other emergency that cant wait, call 9-1-1 and get the person to the nearest hospital. Otherwise, call your doctor.  Reactions should be reported to the Vaccine Adverse Event Reporting System (VAERS). Your doctor should file this report, or you can do it yourself through  the VAERS web site at www.vaers. [facility].gov, or by calling 7-469.369.7904. VAERS does not give medical advice. 6. The National Vaccine Injury Compensation Program    The [facility] Vaccine Injury Compensation Program (VICP) is a federal program that was created to compensate people who may have been injured by certain vaccines. Persons who believe they may have been injured by a vaccine can learn about the program and about filing a claim by calling 6-648.423.9184 or visiting the [employer] website at www.[facility].gov/vaccinecompensation. There is a time limit to file a claim for compensation. 7. How can I learn more?  Ask your healthcare provider. He or she can give you the vaccine package insert or suggest other sources of information.  Call your local or state health department.  Contact the Centers for Disease Control and Prevention (CDC):  - Call 6-830.467.2058 (1-800-CDC-INFO) or  - Visit CDCs website at www.cdc.gov/flu    Vaccine Information Statement   Inactivated Influenza Vaccine   8/7/2015  42 YOEL Alarcon [id number]    Department of Health and Human Services  Centers for Disease Control and Prevention    Office Use Only

## 2017-08-23 NOTE — MR AVS SNAPSHOT
Visit Information Date & Time Provider Department Dept. Phone Encounter #  
 8/23/2017  1:30 PM Tyshawn Braun MD [facility] [phone] [id number] Follow-up Instructions Return in about 3 months (around 11/23/2017) for Follow up hypertension, Follow up hyperlipidemia. Upcoming Health Maintenance Date Due ZOSTER VACCINE AGE 60> 10/17/2005 GLAUCOMA SCREENING Q2Y 12/17/2010 Pneumococcal 65+ Low/Medium Risk (2 of 2 - PPSV23) 12/30/2016 INFLUENZA AGE 9 TO ADULT 8/1/2017 MEDICARE YEARLY EXAM 8/10/2018 BREAST CANCER SCRN MAMMOGRAM 10/19/2018 DTaP/Tdap/Td series (2 - Td) 12/30/2025 COLONOSCOPY 1/13/2026 Allergies as of 8/23/2017  Review Complete On: 8/23/2017 By: Tyshawn Braun MD  
  
 Severity Noted Reaction Type Reactions Amlodipine  10/05/2016    Rash Current Immunizations  Never Reviewed Name Date Influenza High Dose Vaccine PF 8/23/2017  1:56 PM  
 Influenza Vaccine (Quad) PF 10/5/2016 Pneumococcal Conjugate (PCV-13) 12/30/2015  8:30 AM  
 Tdap 12/30/2015  8:32 AM  
  
 Not reviewed this visit You Were Diagnosed With   
  
 Codes Comments Encounter for immunization    -  Primary ICD-10-CM: W81 ICD-9-CM: V03.89 Vitals BP Pulse Temp Resp Height(growth percentile) Weight(growth percentile) 132/74 (BP 1 Location: Right arm, BP Patient Position: Sitting) 77 96.3 °F (35.7 °C) (Oral) 16 5' 6\" (1.676 m) 192 lb 6.4 oz (87.3 kg) SpO2 BMI OB Status Smoking Status 98% 31.05 kg/m2 Hysterectomy Never Smoker Vitals History BMI and BSA Data Body Mass Index Body Surface Area 31.05 kg/m 2 2.02 m 2 Preferred Pharmacy Pharmacy Name Phone [address], [address] [place] [address] ROAD [phone] Your Updated Medication List  
  
   
This list is accurate as of: 8/23/17  2:12 PM.  Always use your most recent med list.  
  
  
  
  
 chlorthalidone 25 mg tablet Commonly known as:  Cherry [place] Take 1 Tab by mouth daily. FISH OIL 1,000 mg Cap Generic drug:  omega-3 fatty acids-vitamin e Take 1 Cap by mouth.  
  
 losartan 100 mg tablet Commonly known as:  COZAAR Take 1 Tab by mouth daily. metoprolol succinate 25 mg XL tablet Commonly known as:  TOPROL-XL Take 1 Tab by mouth daily. multivitamin tablet Commonly known as:  ONE A DAY Take 1 Tab by mouth daily. pregabalin 75 mg capsule Commonly known as:  Shaq Niki Take 1 Cap by mouth two (2) times a day. Max Daily Amount: 150 mg. We Performed the Following INFLUENZA VIRUS VACCINE, HIGH DOSE SEASONAL, PRESERVATIVE FREE [02135 CPT(R)] Follow-up Instructions Return in about 3 months (around 11/23/2017) for Follow up hypertension, Follow up hyperlipidemia. Patient Instructions Vaccine Information Statement Influenza (Flu) Vaccine (Inactivated or Recombinant): What you need to know Many Vaccine Information Statements are available in Micronesian and other languages. See www.immunize.org/vis Hojas de Información Sobre Vacunas están disponibles en Español y en muchos otros idiomas. Visite www.immunize.org/vis 1. Why get vaccinated? Influenza (flu) is a contagious disease that spreads around the [place] every year, usually between October and May. Flu is caused by influenza viruses, and is spread mainly by coughing, sneezing, and close contact. Anyone can get flu. Flu strikes suddenly and can last several days. Symptoms vary by age, but can include: 
 fever/chills  sore throat  muscle aches  fatigue  cough  headache  runny or stuffy nose Flu can also lead to pneumonia and blood infections, and cause diarrhea and seizures in children. If you have a medical condition, such as heart or lung disease, flu can make it worse. Flu is more dangerous for some people. Infants and young children, people 72years of age and older, pregnant women, and people with certain health conditions or a weakened immune system are at greatest risk. Each year thousands of people in the [facility] die from flu, and many more are hospitalized. Flu vaccine can: 
 keep you from getting flu, 
 make flu less severe if you do get it, and 
 keep you from spreading flu to your family and other people. 2. Inactivated and recombinant flu vaccines A dose of flu vaccine is recommended every flu season. Children 6 months through 6years of age may need two doses during the same flu season. Everyone else needs only one dose each flu season. Some inactivated flu vaccines contain a very small amount of a mercury-based preservative called thimerosal. Studies have not shown thimerosal in vaccines to be harmful, but flu vaccines that do not contain thimerosal are available. There is no live flu virus in flu shots. They cannot cause the flu. There are many flu viruses, and they are always changing. Each year a new flu vaccine is made to protect against three or four viruses that are likely to cause disease in the upcoming flu season. But even when the vaccine doesnt exactly match these viruses, it may still provide some protection Flu vaccine cannot prevent: 
 flu that is caused by a virus not covered by the vaccine, or 
 illnesses that look like flu but are not. It takes about 2 weeks for protection to develop after vaccination, and protection lasts through the flu season. 3. Some people should not get this vaccine Tell the person who is giving you the vaccine:  If you have any severe, life-threatening allergies.    
If you ever had a life-threatening allergic reaction after a dose of flu vaccine, or have a severe allergy to any part of this vaccine, you may be advised not to get vaccinated. Most, but not all, types of flu vaccine contain a small amount of egg protein.  If you ever had Guillain-Barré Syndrome (also called GBS). Some people with a history of GBS should not get this vaccine. This should be discussed with your doctor.  If you are not feeling well. It is usually okay to get flu vaccine when you have a mild illness, but you might be asked to come back when you feel better. 4. Risks of a vaccine reaction With any medicine, including vaccines, there is a chance of reactions. These are usually mild and go away on their own, but serious reactions are also possible. Most people who get a flu shot do not have any problems with it. Minor problems following a flu shot include:  
 soreness, redness, or swelling where the shot was given  hoarseness  sore, red or itchy eyes  cough  fever  aches  headache  itching  fatigue If these problems occur, they usually begin soon after the shot and last 1 or 2 days. More serious problems following a flu shot can include the following:  There may be a small increased risk of Guillain-Barré Syndrome (GBS) after inactivated flu vaccine. This risk has been estimated at 1 or 2 additional cases per million people vaccinated. This is much lower than the risk of severe complications from flu, which can be prevented by flu vaccine.  Young children who get the flu shot along with pneumococcal vaccine (PCV13) and/or DTaP vaccine at the same time might be slightly more likely to have a seizure caused by fever. Ask your doctor for more information. Tell your doctor if a child who is getting flu vaccine has ever had a seizure. Problems that could happen after any injected vaccine:  People sometimes faint after a medical procedure, including vaccination.  Sitting or lying down for about 15 minutes can help prevent fainting, and injuries caused by a fall. Tell your doctor if you feel dizzy, or have vision changes or ringing in the ears.  Some people get severe pain in the shoulder and have difficulty moving the arm where a shot was given. This happens very rarely.  Any medication can cause a severe allergic reaction. Such reactions from a vaccine are very rare, estimated at about 1 in a million doses, and would happen within a few minutes to a few hours after the vaccination. As with any medicine, there is a very remote chance of a vaccine causing a serious injury or death. The safety of vaccines is always being monitored. For more information, visit: www.cdc.gov/vaccinesafety/ 
 
 
The [facility] Vaccine Injury Compensation Program (VICP) is a federal program that was created to compensate people who may have been injured by certain vaccines.  
 
Persons who believe they may have been injured by a vaccine can learn about the program and about filing a claim by calling 1-922.968.9081 or visiting the [employer] website at www.[facility].gov/vaccinecompensation. There is a time limit to file a claim for compensation. 7. How can I learn more?  Ask your healthcare provider. He or she can give you the vaccine package insert or suggest other sources of information.  Call your local or state health department.  Contact the Centers for Disease Control and Prevention (CDC): 
- Call 2-391.636.8522 (1-800-CDC-INFO) or 
- Visit CDCs website at www.cdc.gov/flu Vaccine Information Statement Inactivated Influenza Vaccine 8/7/2015 
[address] [place] [id number] [facility] and [employer] Centers for Disease Control and Prevention Office Use Only [facility]! Dear Padmini Wang: Thank you for requesting a [employer] account. Our records indicate that you already have an active [employer] account. You can access your account anytime at [email or web address] Did you know that you can access your hospital and ER discharge instructions at any time in [employer]? You can also review all of your test results from your hospital stay or ER visit. Additional Information If you have questions, please visit the Frequently Asked Questions section of the [employer] website at [email or web address] Remember, [employer] is NOT to be used for urgent needs. For medical emergencies, dial 911. Now available from your iPhone and Android! Please provide this summary of care documentation to your next provider. Your primary care clinician is listed as Tara Perry. If you have any questions after today's visit, please call [phone].

## 2017-08-23 NOTE — PROGRESS NOTES
Internal Medicine Progress Note    Today's Date:  2017   Patient:  Sylwia [facility]  Patient :  1945    Subjective:     Chief Complaint   Patient presents with    Hypertension    Results    Immunization/Injection      Hypertension   This is a chronic problem. BP is not at goal. Pt is on losartan and chlorthalidone. She is compliant with these medications. BP log reviewed and BP is at goal at home except she does go low sometimes. Toprol was started last time but since starting pt feels chest pressure. Back and hip pain/Neuropathy  This is a chronic problem. This is at goal. This has been present for one year. Xrays were done which showed arthritis. Lyrica is effective. Chronic kidney disease  This is a chronic problem. This is not at goal. Creatinine was last checked on 2017. GFR is 55. Past Medical History:   Diagnosis Date    Acute midline low back pain with left-sided sciatica 10/5/2016    Benign essential HTN 2015    CKD (chronic kidney disease), stage III 2015    Elevated hemoglobin A1c 2016    Kidney disease, chronic, stage III (GFR 30-59 ml/min) 2016    Memory loss 2016    Obesity, Class I, BMI 30-34.9 2015    Osteopenia 10/19/2016    Pain of left hip joint 10/5/2016    Post herpetic neuralgia 2015    Prediabetes 2017    Sciatica of left side 2015    White coat syndrome with hypertension 2017     Past Surgical History:   Procedure Laterality Date    APPENDECTOMY      CHOLECYSTOENTEROSTOMY      HX TONSIL AND ADENOIDECTOMY        reports that she has never smoked. She has never used smokeless tobacco. She reports that she drinks alcohol. She reports that she does not use illicit drugs.   Family History   Problem Relation Age of Onset    Hypertension Sister     Breast Cancer Sister      Allergies   Allergen Reactions    Amlodipine Rash     Review of Systems   Positives in bold  CV:      chest pain, palpitations  PULM:  SOB, wheezing, cough, sputum production    Current Outpatient Meds and Allergies     Current Outpatient Prescriptions on File Prior to Visit   Medication Sig Dispense Refill    losartan (COZAAR) 100 mg tablet Take 1 Tab by mouth daily. 90 Tab 3    metoprolol succinate (TOPROL-XL) 25 mg XL tablet Take 1 Tab by mouth daily. 90 Tab 3    pregabalin (LYRICA) 75 mg capsule Take 1 Cap by mouth two (2) times a day. Max Daily Amount: 150 mg. 180 Cap 3    chlorthalidone (HYGROTEN) 25 mg tablet Take 1 Tab by mouth daily. (Patient taking differently: Take 25 mg by mouth every other day.) 90 Tab 3    multivitamin (ONE A DAY) tablet Take 1 Tab by mouth daily.  omega-3 fatty acids-vitamin e (FISH OIL) 1,000 mg cap Take 1 Cap by mouth. No current facility-administered medications on file prior to visit.       Allergies   Allergen Reactions    Amlodipine Rash     Objective:     VS:    Visit Vitals    /74 (BP 1 Location: Right arm, BP Patient Position: Sitting)  Comment: bp at home    Pulse 77    Temp 96.3 °F (35.7 °C) (Oral)    Resp 16    Ht 5' 6\" (1.676 m)    Wt 192 lb 6.4 oz (87.3 kg)    SpO2 98%    BMI 31.05 kg/m2     General:   Well-nourished, well-groomed, pleasant, alert, in no acute distress  Head:  Normocephalic, atraumatic  Ears:  External ears WNL  Nose:  External nares WNL  Psych:  No pressured speech, no abnormal thought content    Hospital Outpatient Visit on 08/09/2017   Component Date Value Ref Range Status    WBC 08/09/2017 9.1  4.6 - 13.2 K/uL Final    RBC 08/09/2017 4.73  4.20 - 5.30 M/uL Final    HGB 08/09/2017 12.5  12.0 - 16.0 g/dL Final    HCT 08/09/2017 40.2  35.0 - 45.0 % Final    MCV 08/09/2017 85.0  74.0 - 97.0 FL Final    MCH 08/09/2017 26.4  24.0 - 34.0 PG Final    MCHC 08/09/2017 31.1  31.0 - 37.0 g/dL Final    RDW 08/09/2017 16.3* 11.6 - 14.5 % Final    PLATELET 32/59/2805 576  135 - 420 K/uL Final    MPV 08/09/2017 12.7* 9.2 - 11.8 FL Final  NEUTROPHILS 08/09/2017 59  40 - 73 % Final    LYMPHOCYTES 08/09/2017 34  21 - 52 % Final    MONOCYTES 08/09/2017 6  3 - 10 % Final    EOSINOPHILS 08/09/2017 1  0 - 5 % Final    BASOPHILS 08/09/2017 0  0 - 2 % Final    ABS. NEUTROPHILS 08/09/2017 5.4  1.8 - 8.0 K/UL Final    ABS. LYMPHOCYTES 08/09/2017 3.1  0.9 - 3.6 K/UL Final    ABS. MONOCYTES 08/09/2017 0.5  0.05 - 1.2 K/UL Final    ABS. EOSINOPHILS 08/09/2017 0.1  0.0 - 0.4 K/UL Final    ABS. BASOPHILS 08/09/2017 0.0  0.0 - 0.06 K/UL Final    DF 08/09/2017 AUTOMATED    Final    T4, Free 08/09/2017 1.2  0.7 - 1.5 NG/DL Final    LIPID PROFILE 08/09/2017        Final    Cholesterol, total 08/09/2017 219* <200 MG/DL Final    Triglyceride 08/09/2017 65  <150 MG/DL Final    Comment: The drugs N-acetylcysteine (NAC) and  Metamiszole have been found to cause falsely  low results in this chemical assay. Please  be sure to submit blood samples obtained  BEFORE administration of either of these  drugs to assure correct results.       HDL Cholesterol 08/09/2017 80* 40 - 60 MG/DL Final    LDL, calculated 08/09/2017 126* 0 - 100 MG/DL Final    VLDL, calculated 08/09/2017 13  MG/DL Final    CHOL/HDL Ratio 08/09/2017 2.7  0 - 5.0   Final    Sodium 08/09/2017 140  136 - 145 mmol/L Final    Potassium 08/09/2017 4.2  3.5 - 5.5 mmol/L Final    Chloride 08/09/2017 106  100 - 108 mmol/L Final    CO2 08/09/2017 25  21 - 32 mmol/L Final    Anion gap 08/09/2017 9  3.0 - 18 mmol/L Final    Glucose 08/09/2017 97  74 - 99 mg/dL Final    BUN 08/09/2017 14  7.0 - 18 MG/DL Final    Creatinine 08/09/2017 1.19  0.6 - 1.3 MG/DL Final    BUN/Creatinine ratio 08/09/2017 12  12 - 20   Final    GFR est AA 08/09/2017 54* >60 ml/min/1.73m2 Final    GFR est non-AA 08/09/2017 45* >60 ml/min/1.73m2 Final    Comment: (NOTE)  Estimated GFR is calculated using the Modification of Diet in Renal   Disease (MDRD) Study equation, reported for both  Americans   (GFRAA) and non- Americans (GFRNA), and normalized to 1.73m2   body surface area. The physician must decide which value applies to   the patient. The MDRD study equation should only be used in   individuals age 25 or older. It has not been validated for the   following: pregnant women, patients with serious comorbid conditions,   or on certain medications, or persons with extremes of body size,   muscle mass, or nutritional status.  Calcium 08/09/2017 9.5  8.5 - 10.1 MG/DL Final    Bilirubin, total 08/09/2017 0.6  0.2 - 1.0 MG/DL Final    ALT (SGPT) 08/09/2017 29  13 - 56 U/L Final    AST (SGOT) 08/09/2017 32  15 - 37 U/L Final    Alk. phosphatase 08/09/2017 107  45 - 117 U/L Final    Protein, total 08/09/2017 7.9  6.4 - 8.2 g/dL Final    Albumin 08/09/2017 4.2  3.4 - 5.0 g/dL Final    Globulin 08/09/2017 3.7  2.0 - 4.0 g/dL Final    A-G Ratio 08/09/2017 1.1  0.8 - 1.7   Final    TSH 08/09/2017 1.48  0.36 - 3.74 uIU/mL Final    Hemoglobin A1c 08/09/2017 6.0* 4.2 - 5.6 % Final    Comment: (NOTE)  HbA1C Interpretive Ranges  <5.7              Normal  5.7 - 6.4         Consider Prediabetes  >6.5              Consider Diabetes      Est. average glucose 08/09/2017 126  mg/dL Final    Comment: (NOTE)  The eAG should be interpreted with patient characteristics in mind   since ethnicity, interindividual differences, red cell lifespan,   variation in rates of glycation, etc. may affect the validity of the   calculation.  Vitamin D 25-Hydroxy 08/09/2017 37.0  30 - 100 ng/mL Final    Comment: (NOTE)  Deficiency               <20 ng/mL  Insufficiency          20-30 ng/mL  Sufficient             ng/mL  Possible toxicity       >100 ng/mL    The Method used is Siemens Advia Centaur currently standardized to a   Center of Disease Control and Prevention (CDC) certified reference   [address]. Samples containing fluorescein dye can produce falsely   elevated values when tested with the ADVIA Centaur Vitamin D Assay. It is recommended that results in the toxic range, >100 ng/mL, be   retested 72 hours post fluorescein exposure. Assessment/Plan & Orders:         ICD-10-CM ICD-9-CM    1. Benign essential HTN I10 401.1    2. White coat syndrome with hypertension I10 401.9    3. Prediabetes R73.03 790.29    4. Kidney disease, chronic, stage III (GFR 30-59 ml/min) N18.3 585.3    5. Obesity, Class I, BMI 30-34.9 E66.9 278.00    6. Encounter for immunization Z23 V03.89 INFLUENZA VIRUS VACCINE, HIGH DOSE SEASONAL, PRESERVATIVE FREE      Healthy lifestyle has been encouraged including avoidance of tobacco, limiting or avoiding alcohol intake, heart healthy diet which is low in cholesterol and saturated fat and contains fresh fruits, vegetables and whole grains and fiber, regular exercise with goals of 20-30 minutes 3-5 days weekly and maintaining an optimal BMI. Pt to continue chlorthalidone and losartan  Pt states that colonoscopy is up to date. Done 9 yrs ago. She had FOBT testing done. She is unsure of the result. Pt to get eye exam. Form given  Stop toprol XL for a week. Check BP at home and call us if BP is >150/90    Follow-up Disposition:  Return in about 3 months (around 11/23/2017) for Follow up hypertension, Follow up hyperlipidemia. *Patient verbalized understanding and agreement with the plan. Patient was given an after-visit summary. Erick Benitez.  Yuko Connor MD - Internal Medicine  8/23/2017, 8:17 AM  [facility]  [address] [facility] [place], [address]  Phone [phone]  Fax [phone]

## 2017-08-23 NOTE — PROGRESS NOTES
Margie Vaughn is a 70 y.o.  female presents today for office visit for follow up. Pt is in Room # 3      1. Have you been to the ER, urgent care clinic since your last visit? Hospitalized since your last visit? No    2. Have you seen or consulted any other health care providers outside of the [facility] since your last visit? Include any pap smears or colon screening. No    No Patient Care Coordination Note on file. Margie Vaughn is a 70 y.o. female who presents for routine immunizations. She denies any symptoms , reactions or allergies that would exclude them from being immunized today. Risks and adverse reactions were discussed and the VIS was given to them. All questions were addressed. She was observed for 10 min post injection. There were no reactions observed.     Brett Demarco LPN      VORB: Administer Fluzone via IM injection once./Trisha Muller MD/ Brett Demarco LPN

## 2017-12-06 ENCOUNTER — PATIENT OUTREACH (OUTPATIENT)
Dept: FAMILY MEDICINE CLINIC | Facility: CLINIC | Age: 72
End: 2017-12-06

## 2017-12-07 ENCOUNTER — PATIENT OUTREACH (OUTPATIENT)
Dept: FAMILY MEDICINE CLINIC | Facility: CLINIC | Age: 72
End: 2017-12-07

## 2017-12-27 ENCOUNTER — HOSPITAL ENCOUNTER (OUTPATIENT)
Dept: LAB | Age: 72
Discharge: HOME | End: 2017-12-27
Payer: MEDICARE

## 2017-12-27 ENCOUNTER — OFFICE VISIT (OUTPATIENT)
Dept: FAMILY MEDICINE CLINIC | Facility: CLINIC | Age: 72
End: 2017-12-27

## 2017-12-27 VITALS
HEART RATE: 70 BPM | BODY MASS INDEX: 29.92 KG/M2 | TEMPERATURE: 98.1 F | RESPIRATION RATE: 16 BRPM | OXYGEN SATURATION: 98 % | DIASTOLIC BLOOD PRESSURE: 82 MMHG | WEIGHT: 186.2 LBS | HEIGHT: 66 IN | SYSTOLIC BLOOD PRESSURE: 132 MMHG

## 2017-12-27 DIAGNOSIS — N18.30 KIDNEY DISEASE, CHRONIC, STAGE III (GFR 30-59 ML/MIN) (HCC): ICD-10-CM

## 2017-12-27 DIAGNOSIS — R73.03 PREDIABETES: ICD-10-CM

## 2017-12-27 DIAGNOSIS — I10 WHITE COAT SYNDROME WITH HYPERTENSION: ICD-10-CM

## 2017-12-27 DIAGNOSIS — I10 BENIGN ESSENTIAL HTN: Primary | ICD-10-CM

## 2017-12-27 DIAGNOSIS — Z23 ENCOUNTER FOR IMMUNIZATION: ICD-10-CM

## 2017-12-27 LAB
ANION GAP SERPL CALC-SCNC: 6 MMOL/L (ref 3–18)
BUN SERPL-MCNC: 12 MG/DL (ref 7–18)
BUN/CREAT SERPL: 10 (ref 12–20)
CALCIUM SERPL-MCNC: 9.4 MG/DL (ref 8.5–10.1)
CHLORIDE SERPL-SCNC: 106 MMOL/L (ref 100–108)
CO2 SERPL-SCNC: 29 MMOL/L (ref 21–32)
CREAT SERPL-MCNC: 1.18 MG/DL (ref 0.6–1.3)
GLUCOSE SERPL-MCNC: 90 MG/DL (ref 74–99)
HBA1C MFR BLD: 5.8 % (ref 4.2–5.6)
POTASSIUM SERPL-SCNC: 4.3 MMOL/L (ref 3.5–5.5)
SODIUM SERPL-SCNC: 141 MMOL/L (ref 136–145)

## 2017-12-27 PROCEDURE — 83036 HEMOGLOBIN GLYCOSYLATED A1C: CPT | Performed by: INTERNAL MEDICINE

## 2017-12-27 PROCEDURE — 80048 BASIC METABOLIC PNL TOTAL CA: CPT | Performed by: INTERNAL MEDICINE

## 2017-12-27 PROCEDURE — 36415 COLL VENOUS BLD VENIPUNCTURE: CPT | Performed by: INTERNAL MEDICINE

## 2017-12-27 NOTE — PROGRESS NOTES
Chief Complaint   Patient presents with    Hypertension    Cholesterol Problem     Vitals:    12/27/17 1401 12/27/17 1408   BP: (!) 157/99 (!) 158/100  Comment: manual   BP 1 Location: Right arm Left arm   BP Patient Position: Sitting Sitting   Pulse: 70    Resp: 16    Temp: 98.1 °F (36.7 °C)    TempSrc: Oral    SpO2: 98%    Weight: 186 lb 3.2 oz (84.5 kg)    Height: 5' 6\" (1.676 m)      Patient is not fasting. Patient in room # 2.     1. Have you been to the ER, urgent care clinic since your last visit? Hospitalized since your last visit? No    2. Have you seen or consulted any other health care providers outside of the [address] [place] [address] since your last visit? Include any pap smears or colon screening. No     Reviewed.

## 2017-12-27 NOTE — PROGRESS NOTES
Internal Medicine Progress Note    Today's Date:  2017   Patient:  Madison Wong  Patient :  1945    Subjective:     Chief Complaint   Patient presents with    Hypertension    Cholesterol Problem      Hypertension   This is a chronic problem. BP is not at goal. Pt is on losartan and chlorthalidone. She is compliant with these medications. BP is at goal at home. Toprol was started last time but since starting pt felt chest pressure and stopped it. Pt was on norvasc previously and does not want to restart. Back and hip pain/Neuropathy  This is a chronic problem. This is at goal. This has been present for one year. Xrays were done which showed arthritis. Lyrica is effective. Chronic kidney disease  This is a chronic problem. This is not at goal. Creatinine was last checked on 2017. GFR is 55. Past Medical History:   Diagnosis Date    Acute midline low back pain with left-sided sciatica 10/5/2016    Benign essential HTN 2015    CKD (chronic kidney disease), stage III 2015    Elevated hemoglobin A1c 2016    Kidney disease, chronic, stage III (GFR 30-59 ml/min) 2016    Memory loss 2016    Obesity, Class I, BMI 30-34.9 2015    Osteopenia 10/19/2016    Pain of left hip joint 10/5/2016    Post herpetic neuralgia 2015    Prediabetes 2017    Sciatica of left side 2015    White coat syndrome with hypertension 2017     Past Surgical History:   Procedure Laterality Date    APPENDECTOMY      CHOLECYSTOENTEROSTOMY      HX TONSIL AND ADENOIDECTOMY        reports that she has never smoked. She has never used smokeless tobacco. She reports that she drinks alcohol. She reports that she does not use illicit drugs.   Family History   Problem Relation Age of Onset    Hypertension Sister     Breast Cancer Sister      Allergies   Allergen Reactions    Amlodipine Rash     Review of Systems   Positives in bold  CV:      chest pain, palpitations  PULM:  SOB, wheezing, cough, sputum production    Current Outpatient Meds and Allergies     Current Outpatient Prescriptions on File Prior to Visit   Medication Sig Dispense Refill    losartan (COZAAR) 100 mg tablet Take 1 Tab by mouth daily. 90 Tab 3    pregabalin (LYRICA) 75 mg capsule Take 1 Cap by mouth two (2) times a day. Max Daily Amount: 150 mg. 180 Cap 3    chlorthalidone (HYGROTEN) 25 mg tablet Take 1 Tab by mouth daily. 90 Tab 3    multivitamin (ONE A DAY) tablet Take 1 Tab by mouth daily.  omega-3 fatty acids-vitamin e (FISH OIL) 1,000 mg cap Take 1 Cap by mouth. No current facility-administered medications on file prior to visit. Allergies   Allergen Reactions    Amlodipine Rash     Objective:     VS:    Visit Vitals    /82  Comment: home with wrist cuff    Pulse 70    Temp 98.1 °F (36.7 °C) (Oral)    Resp 16    Ht 5' 6\" (1.676 m)    Wt 186 lb 3.2 oz (84.5 kg)    SpO2 98%    BMI 30.05 kg/m2     General:   Well-nourished, well-groomed, pleasant, alert, in no acute distress  Head:  Normocephalic, atraumatic  Ears:  External ears WNL  Nose:  External nares WNL  Psych:  No pressured speech, no abnormal thought content    PHQ over the last two weeks 8/9/2017   Little interest or pleasure in doing things Not at all   Feeling down, depressed or hopeless Nearly every day   Total Score PHQ 2 3     No visits with results within 3 Month(s) from this visit.   Latest known visit with results is:    Hospital Outpatient Visit on 08/09/2017   Component Date Value Ref Range Status    WBC 08/09/2017 9.1  4.6 - 13.2 K/uL Final    RBC 08/09/2017 4.73  4.20 - 5.30 M/uL Final    HGB 08/09/2017 12.5  12.0 - 16.0 g/dL Final    HCT 08/09/2017 40.2  35.0 - 45.0 % Final    MCV 08/09/2017 85.0  74.0 - 97.0 FL Final    MCH 08/09/2017 26.4  24.0 - 34.0 PG Final    MCHC 08/09/2017 31.1  31.0 - 37.0 g/dL Final    RDW 08/09/2017 16.3* 11.6 - 14.5 % Final    PLATELET 84/78/8681 203  135 - 420 K/uL Final    MPV 08/09/2017 12.7* 9.2 - 11.8 FL Final    NEUTROPHILS 08/09/2017 59  40 - 73 % Final    LYMPHOCYTES 08/09/2017 34  21 - 52 % Final    MONOCYTES 08/09/2017 6  3 - 10 % Final    EOSINOPHILS 08/09/2017 1  0 - 5 % Final    BASOPHILS 08/09/2017 0  0 - 2 % Final    ABS. NEUTROPHILS 08/09/2017 5.4  1.8 - 8.0 K/UL Final    ABS. LYMPHOCYTES 08/09/2017 3.1  0.9 - 3.6 K/UL Final    ABS. MONOCYTES 08/09/2017 0.5  0.05 - 1.2 K/UL Final    ABS. EOSINOPHILS 08/09/2017 0.1  0.0 - 0.4 K/UL Final    ABS. BASOPHILS 08/09/2017 0.0  0.0 - 0.06 K/UL Final    DF 08/09/2017 AUTOMATED    Final    T4, Free 08/09/2017 1.2  0.7 - 1.5 NG/DL Final    LIPID PROFILE 08/09/2017        Final    Cholesterol, total 08/09/2017 219* <200 MG/DL Final    Triglyceride 08/09/2017 65  <150 MG/DL Final    Comment: The drugs N-acetylcysteine (NAC) and  Metamiszole have been found to cause falsely  low results in this chemical assay. Please  be sure to submit blood samples obtained  BEFORE administration of either of these  drugs to assure correct results.       HDL Cholesterol 08/09/2017 80* 40 - 60 MG/DL Final    LDL, calculated 08/09/2017 126* 0 - 100 MG/DL Final    VLDL, calculated 08/09/2017 13  MG/DL Final    CHOL/HDL Ratio 08/09/2017 2.7  0 - 5.0   Final    Sodium 08/09/2017 140  136 - 145 mmol/L Final    Potassium 08/09/2017 4.2  3.5 - 5.5 mmol/L Final    Chloride 08/09/2017 106  100 - 108 mmol/L Final    CO2 08/09/2017 25  21 - 32 mmol/L Final    Anion gap 08/09/2017 9  3.0 - 18 mmol/L Final    Glucose 08/09/2017 97  74 - 99 mg/dL Final    BUN 08/09/2017 14  7.0 - 18 MG/DL Final    Creatinine 08/09/2017 1.19  0.6 - 1.3 MG/DL Final    BUN/Creatinine ratio 08/09/2017 12  12 - 20   Final    GFR est AA 08/09/2017 54* >60 ml/min/1.73m2 Final    GFR est non-AA 08/09/2017 45* >60 ml/min/1.73m2 Final    Comment: (NOTE)  Estimated GFR is calculated using the Modification of Diet in Renal   Disease (MDRD) Study equation, reported for both  Americans   (GFRAA) and non- Americans (GFRNA), and normalized to 1.73m2   body surface area. The physician must decide which value applies to   the patient. The MDRD study equation should only be used in   individuals age 25 or older. It has not been validated for the   following: pregnant women, patients with serious comorbid conditions,   or on certain medications, or persons with extremes of body size,   muscle mass, or nutritional status.  Calcium 08/09/2017 9.5  8.5 - 10.1 MG/DL Final    Bilirubin, total 08/09/2017 0.6  0.2 - 1.0 MG/DL Final    ALT (SGPT) 08/09/2017 29  13 - 56 U/L Final    AST (SGOT) 08/09/2017 32  15 - 37 U/L Final    Alk. phosphatase 08/09/2017 107  45 - 117 U/L Final    Protein, total 08/09/2017 7.9  6.4 - 8.2 g/dL Final    Albumin 08/09/2017 4.2  3.4 - 5.0 g/dL Final    Globulin 08/09/2017 3.7  2.0 - 4.0 g/dL Final    A-G Ratio 08/09/2017 1.1  0.8 - 1.7   Final    TSH 08/09/2017 1.48  0.36 - 3.74 uIU/mL Final    Hemoglobin A1c 08/09/2017 6.0* 4.2 - 5.6 % Final    Comment: (NOTE)  HbA1C Interpretive Ranges  <5.7              Normal  5.7 - 6.4         Consider Prediabetes  >6.5              Consider Diabetes      Est. average glucose 08/09/2017 126  mg/dL Final    Comment: (NOTE)  The eAG should be interpreted with patient characteristics in mind   since ethnicity, interindividual differences, red cell lifespan,   variation in rates of glycation, etc. may affect the validity of the   calculation.  Vitamin D 25-Hydroxy 08/09/2017 37.0  30 - 100 ng/mL Final    Comment: (NOTE)  Deficiency               <20 ng/mL  Insufficiency          20-30 ng/mL  Sufficient             ng/mL  Possible toxicity       >100 ng/mL    The Method used is Siemens Advia Centaur currently standardized to a   Center of Disease Control and Prevention (CDC) certified reference   [address]  Samples containing fluorescein dye can produce falsely   elevated values when tested with the ADVIA Centaur Vitamin D Assay. It is recommended that results in the toxic range, >100 ng/mL, be   retested 72 hours post fluorescein exposure. Assessment/Plan & Orders:         ICD-10-CM ICD-9-CM    1. Benign essential HTN X57 907.2 METABOLIC PANEL, BASIC      METABOLIC PANEL, BASIC   2. White coat syndrome with hypertension I10 401.9    3. Prediabetes R73.03 790.29 HEMOGLOBIN A1C W/O EAG      HEMOGLOBIN A1C W/O EAG   4. Kidney disease, chronic, stage III (GFR 30-59 ml/min) N18.3 585.3    5. Encounter for immunization Z23 V03.89 pneumococcal 23-valent (PNEUMOVAX 23) 25 mcg/0.5 mL injection      varicella zoster vaccine live (VARICELLA-ZOSTER VACINE LIVE) 19,400 unit/0.65 mL susr injection      Healthy lifestyle has been encouraged including avoidance of tobacco, limiting or avoiding alcohol intake, heart healthy diet which is low in cholesterol and saturated fat and contains fresh fruits, vegetables and whole grains and fiber, regular exercise with goals of 20-30 minutes 3-5 days weekly and maintaining an optimal BMI. Pt to take losartan in the morning  Pt would like to delay colonoscopy for one more year. Completed 9 yrs ago. She had FOBT testing done through a nurse that came to her house  Pt to get eye exam. Form given  Check BP at home and bring BP cuff to the office at next visit  Depression screenin17    Follow-up Disposition:  Return in about 2 weeks (around 1/10/2018) for Follow up hypertension, Follow up hyperlipidemia. *Patient verbalized understanding and agreement with the plan. Patient was given an after-visit summary. Senthil Sim.  [address], MD - Internal Medicine  2017, 8:17 AM  [facility]  [address] [place], [address]  Phone [phone]  Fax [phone]

## 2017-12-27 NOTE — MR AVS SNAPSHOT
Visit Information Date & Time Provider Department Dept. Phone Encounter #  
 12/27/2017  2:00 PM Cole Hartley MD [facility] [phone] [id number] Follow-up Instructions Return in about 2 weeks (around 1/10/2018) for Follow up hypertension, Follow up hyperlipidemia. Upcoming Health Maintenance Date Due FOBT Q 1 YEAR, 18+ 12/17/1963 ZOSTER VACCINE AGE 60> 10/17/2005 GLAUCOMA SCREENING Q2Y 12/17/2010 Pneumococcal 65+ Low/Medium Risk (2 of 2 - PPSV23) 12/30/2016 MEDICARE YEARLY EXAM 8/10/2018 DTaP/Tdap/Td series (2 - Td) 12/30/2025 COLONOSCOPY 12/27/2027 Allergies as of 12/27/2017  Review Complete On: 12/27/2017 By: Cole Hartley MD  
  
 Severity Noted Reaction Type Reactions Amlodipine  10/05/2016    Rash Current Immunizations  Never Reviewed Name Date Influenza High Dose Vaccine PF 8/23/2017  1:56 PM  
 Influenza Vaccine (Quad) PF 10/5/2016 Pneumococcal Conjugate (PCV-13) 12/30/2015  8:30 AM  
 Tdap 12/30/2015  8:32 AM  
  
 Not reviewed this visit You Were Diagnosed With   
  
 Codes Comments Benign essential HTN    -  Primary ICD-10-CM: I10 
ICD-9-CM: 003. 1 White coat syndrome with hypertension     ICD-10-CM: I10 
ICD-9-CM: 401.9 Prediabetes     ICD-10-CM: R73.03 
ICD-9-CM: 790.29 Kidney disease, chronic, stage III (GFR 30-59 ml/min)     ICD-10-CM: N18.3 ICD-9-CM: 585.3 Encounter for immunization     ICD-10-CM: O98 ICD-9-CM: V03.89 Vitals BP Pulse Temp Resp Height(growth percentile) Weight(growth percentile) 132/82 70 98.1 °F (36.7 °C) (Oral) 16 5' 6\" (1.676 m) 186 lb 3.2 oz (84.5 kg) SpO2 BMI OB Status Smoking Status 98% 30.05 kg/m2 Hysterectomy Never Smoker Vitals History BMI and BSA Data Body Mass Index Body Surface Area 30.05 kg/m 2 1.98 m 2 Preferred Pharmacy Pharmacy Name Phone [address] [place] [address], [address] [place] [address] [phone] Your Updated Medication List  
  
   
This list is accurate as of: 17  2:30 PM.  Always use your most recent med list.  
  
  
  
  
 chlorthalidone 25 mg tablet Commonly known as:  Marnette Castellani Take 1 Tab by mouth daily. FISH OIL 1,000 mg Cap Generic drug:  omega-3 fatty acids-vitamin e Take 1 Cap by mouth.  
  
 losartan 100 mg tablet Commonly known as:  COZAAR Take 1 Tab by mouth daily. multivitamin tablet Commonly known as:  ONE A DAY Take 1 Tab by mouth daily. pneumococcal 23-valent 25 mcg/0.5 mL injection Commonly known as:  PNEUMOVAX 23  
0.5 mL by IntraMUSCular route once for 1 dose. pregabalin 75 mg capsule Commonly known as:  Walda Smoker Take 1 Cap by mouth two (2) times a day. Max Daily Amount: 150 mg.  
  
 varicella zoster vaccine live 19,400 unit/0.65 mL Susr injection Commonly known as:  varicella-zoster vacine live 1 Vial by SubCUTAneous route once for 1 dose. VITAMIN D3 PO Take  by mouth. Prescriptions Printed Refills  
 pneumococcal 23-valent (PNEUMOVAX 23) 25 mcg/0.5 mL injection 0 Si.5 mL by IntraMUSCular route once for 1 dose. Class: Print Route: IntraMUSCular  
 varicella zoster vaccine live (VARICELLA-ZOSTER VACINE LIVE) 19,400 unit/0.65 mL susr injection 0 Si Vial by SubCUTAneous route once for 1 dose. Class: Print Route: SubCUTAneous Follow-up Instructions Return in about 2 weeks (around 1/10/2018) for Follow up hypertension, Follow up hyperlipidemia. To-Do List   
 2017 Lab:  HEMOGLOBIN A1C W/O EAG   
  
 2017 Lab:  METABOLIC PANEL, BASIC Patient Instructions Hyperlipidemia: After Your Visit Your Care Instructions Hyperlipidemia is too much fat in your blood.  The body has several kinds of fat, including cholesterol and triglycerides. Your body needs fat for many things, such as making new cells. But too much fat in your blood increases your chances of having a heart attack or stroke. You may be able to lower your cholesterol and triglycerides with a heart-healthy diet, exercise, and if needed, medicine. Your doctor may want you to try lifestyle changes first to see whether they lower the fat in your blood. You may need to take medicine if lifestyle changes do not lower the fat in your blood enough. Follow-up care is a key part of your treatment and safety. Be sure to make and go to all appointments, and call your doctor if you are having problems. Its also a good idea to know your test results and keep a list of the medicines you take. How can you care for yourself at home? Take your medicines · Take your medicines exactly as prescribed. Call your doctor if you think you are having a problem with your medicine. · If you take medicine to lower your cholesterol, go to follow-up visits. You will need to have blood tests. · Do not take large doses of niacin, which is a B vitamin, while taking medicine called statins. It may increase the chance of muscle pain and liver problems. · Talk to your doctor about avoiding grapefruit juice if you are taking statins. Grapefruit juice can raise the level of this medicine in your blood. This could increase side effects. Eat more fruits, vegetables, and fiber · Fruits and vegetables have lots of nutrients that help protect against heart disease, and they have littleif anyfat. Try to eat at least five servings a day. Dark green, deep orange, or yellow fruits and vegetables are healthy choices. · Keep carrots, celery, and other veggies handy for snacks. Buy fruit that is in season and store it where you can see it so that you will be tempted to eat it. Cook dishes that have a lot of veggies in them, such as stir-fries and soups. · Foods high in fiber may reduce your cholesterol and provide important vitamins and minerals. High-fiber foods include whole-grain cereals and breads, oatmeal, beans, brown rice, citrus fruits, and apples. · Buy whole-grain breads and cereals instead of white bread and pastries. Limit saturated fat · Read food labels and try to avoid saturated fat and trans fat. They increase your risk of heart disease. · Use olive or canola oil when you cook. Try cholesterol-lowering spreads, such as Benecol or Take Control. · Bake, broil, grill, or steam foods instead of frying them. · Limit the amount of high-fat meats you eat, including hot dogs and sausages. Cut out all visible fat when you prepare meat. · Eat fish, skinless poultry, and soy products such as tofu instead of high-fat meats. Soybeans may be especially good for your heart. Eat at least two servings of fish a week. Certain fish, such as salmon, contain omega-3 fatty acids, which may help reduce your risk of heart attack. · Choose low-fat or fat-free milk and dairy products. Get exercise, limit alcohol, and quit smoking · Get more exercise. Work with your doctor to set up an exercise program. Even if you can do only a small amount, exercise will help you get stronger, have more energy, and manage your weight and your stress. Walking is an easy way to get exercise. Gradually increase the amount you walk every day. Aim for at least 30 minutes on most days of the week. You also may want to swim, bike, or do other activities. · Limit alcohol to no more than 2 drinks a day for men and 1 drink a day for women. · Do not smoke. If you need help quitting, talk to your doctor about stop-smoking programs and medicines. These can increase your chances of quitting for good. When should you call for help? Call 911 anytime you think you may need emergency care. For example, call if: 
· You have symptoms of a heart attack. These may include: ¨ Chest pain or pressure, or a strange feeling in the chest. 
¨ Sweating. ¨ Shortness of breath. ¨ Nausea or vomiting. ¨ Pain, pressure, or a strange feeling in the back, neck, jaw, or upper belly or in one or both shoulders or arms. ¨ Lightheadedness or sudden weakness. ¨ A fast or irregular heartbeat. After you call 911, the  may tell you to chew 1 adult-strength or 2 to 4 low-dose aspirin. Wait for an ambulance. Do not try to drive yourself. · You have signs of a stroke. These may include: 
¨ Sudden numbness, paralysis, or weakness in your face, arm, or leg, especially on only one side of your body. ¨ New problems with walking or balance. ¨ Sudden vision changes. ¨ Drooling or slurred speech. ¨ New problems speaking or understanding simple statements, or feeling confused. ¨ A sudden, severe headache that is different from past headaches. · You passed out (lost consciousness). Call your doctor now or seek immediate medical care if: 
· You have muscle pain or weakness. Watch closely for changes in your health, and be sure to contact your doctor if: 
· You are very tired. · You have an upset stomach, gas, constipation, or belly pain or cramps. Where can you learn more? Go to [employer].be Enter C406 in the search box to learn more about \"Hyperlipidemia: After Your Visit. \"  
© 1690-4348 [employer]wise, [employer]orporated. Care instructions adapted under license by [employer] Del[employer] (which disclaims liability or warranty for this information). This care instruction is for use with your licensed healthcare professional. If you have questions about a medical condition or this instruction, always ask your healthcare professional. [place] [address] any warranty or liability for your use of this information. Content Version: 2.0.451090; Last Revised: October 13, 2011 Introducing [facility]! Dear Halie Hayward: Thank you for requesting a [employer] account. Our records indicate that you already have an active [employer] account. You can access your account anytime at [email or web address] Did you know that you can access your hospital and ER discharge instructions at any time in [employer]? You can also review all of your test results from your hospital stay or ER visit. Additional Information If you have questions, please visit the Frequently Asked Questions section of the [employer] website at [email or web address] Remember, [employer] is NOT to be used for urgent needs. For medical emergencies, dial 911. Now available from your iPhone and Android! Please provide this summary of care documentation to your next provider. Your primary care clinician is listed as Caleb aJcques. If you have any questions after today's visit, please call [phone].

## 2017-12-27 NOTE — PATIENT INSTRUCTIONS
Hyperlipidemia: After Your Visit  Your Care Instructions  Hyperlipidemia is too much fat in your blood. The body has several kinds of fat, including cholesterol and triglycerides. Your body needs fat for many things, such as making new cells. But too much fat in your blood increases your chances of having a heart attack or stroke. You may be able to lower your cholesterol and triglycerides with a heart-healthy diet, exercise, and if needed, medicine. Your doctor may want you to try lifestyle changes first to see whether they lower the fat in your blood. You may need to take medicine if lifestyle changes do not lower the fat in your blood enough. Follow-up care is a key part of your treatment and safety. Be sure to make and go to all appointments, and call your doctor if you are having problems. Its also a good idea to know your test results and keep a list of the medicines you take. How can you care for yourself at home? Take your medicines  · Take your medicines exactly as prescribed. Call your doctor if you think you are having a problem with your medicine. · If you take medicine to lower your cholesterol, go to follow-up visits. You will need to have blood tests. · Do not take large doses of niacin, which is a B vitamin, while taking medicine called statins. It may increase the chance of muscle pain and liver problems. · Talk to your doctor about avoiding grapefruit juice if you are taking statins. Grapefruit juice can raise the level of this medicine in your blood. This could increase side effects. Eat more fruits, vegetables, and fiber  · Fruits and vegetables have lots of nutrients that help protect against heart disease, and they have littleif anyfat. Try to eat at least five servings a day. Dark green, deep orange, or yellow fruits and vegetables are healthy choices. · Keep carrots, celery, and other veggies handy for snacks.  Buy fruit that is in season and store it where you can see it so that you will be tempted to eat it. Cook dishes that have a lot of veggies in them, such as stir-fries and soups. · Foods high in fiber may reduce your cholesterol and provide important vitamins and minerals. High-fiber foods include whole-grain cereals and breads, oatmeal, beans, brown rice, citrus fruits, and apples. · Buy whole-grain breads and cereals instead of white bread and pastries. Limit saturated fat  · Read food labels and try to avoid saturated fat and trans fat. They increase your risk of heart disease. · Use olive or canola oil when you cook. Try cholesterol-lowering spreads, such as Benecol or Take Control. · Bake, broil, grill, or steam foods instead of frying them. · Limit the amount of high-fat meats you eat, including hot dogs and sausages. Cut out all visible fat when you prepare meat. · Eat fish, skinless poultry, and soy products such as tofu instead of high-fat meats. Soybeans may be especially good for your heart. Eat at least two servings of fish a week. Certain fish, such as salmon, contain omega-3 fatty acids, which may help reduce your risk of heart attack. · Choose low-fat or fat-free milk and dairy products. Get exercise, limit alcohol, and quit smoking  · Get more exercise. Work with your doctor to set up an exercise program. Even if you can do only a small amount, exercise will help you get stronger, have more energy, and manage your weight and your stress. Walking is an easy way to get exercise. Gradually increase the amount you walk every day. Aim for at least 30 minutes on most days of the week. You also may want to swim, bike, or do other activities. · Limit alcohol to no more than 2 drinks a day for men and 1 drink a day for women. · Do not smoke. If you need help quitting, talk to your doctor about stop-smoking programs and medicines. These can increase your chances of quitting for good. When should you call for help?   Call 911 anytime you think you may need emergency care. For example, call if:  · You have symptoms of a heart attack. These may include:  ¨ Chest pain or pressure, or a strange feeling in the chest.  ¨ Sweating. ¨ Shortness of breath. ¨ Nausea or vomiting. ¨ Pain, pressure, or a strange feeling in the back, neck, jaw, or upper belly or in one or both shoulders or arms. ¨ Lightheadedness or sudden weakness. ¨ A fast or irregular heartbeat. After you call 911, the  may tell you to chew 1 adult-strength or 2 to 4 low-dose aspirin. Wait for an ambulance. Do not try to drive yourself. · You have signs of a stroke. These may include:  ¨ Sudden numbness, paralysis, or weakness in your face, arm, or leg, especially on only one side of your body. ¨ New problems with walking or balance. ¨ Sudden vision changes. ¨ Drooling or slurred speech. ¨ New problems speaking or understanding simple statements, or feeling confused. ¨ A sudden, severe headache that is different from past headaches. · You passed out (lost consciousness). Call your doctor now or seek immediate medical care if:  · You have muscle pain or weakness. Watch closely for changes in your health, and be sure to contact your doctor if:  · You are very tired. · You have an upset stomach, gas, constipation, or belly pain or cramps. Where can you learn more? Go to [employer]  Enter C406 in the search box to learn more about \"Hyperlipidemia: After Your Visit. \"   © 9663-1815 [employer]. Care instructions adapted under license by [employer] (which disclaims liability or warranty for this information). This care instruction is for use with your licensed healthcare professional. If you have questions about a medical condition or this instruction, always ask your healthcare professional. [place] [address] any warranty or liability for your use of this information.   Content Version: 6.8.803412; Last Revised: October 13, 2011

## 2018-05-23 ENCOUNTER — TELEPHONE (OUTPATIENT)
Dept: FAMILY MEDICINE CLINIC | Facility: CLINIC | Age: 73
End: 2018-05-23

## 2018-12-12 ENCOUNTER — DOCUMENTATION ONLY (OUTPATIENT)
Dept: FAMILY MEDICINE CLINIC | Age: 73
End: 2018-12-12

## 2019-03-11 ENCOUNTER — DOCUMENTATION ONLY (OUTPATIENT)
Dept: FAMILY MEDICINE CLINIC | Age: 74
End: 2019-03-11

## 2019-03-11 ENCOUNTER — OFFICE VISIT (OUTPATIENT)
Dept: FAMILY MEDICINE CLINIC | Facility: CLINIC | Age: 74
End: 2019-03-11

## 2019-03-11 ENCOUNTER — HOSPITAL ENCOUNTER (OUTPATIENT)
Dept: LAB | Age: 74
Discharge: HOME | End: 2019-03-11
Payer: MEDICARE

## 2019-03-11 VITALS
WEIGHT: 198.6 LBS | DIASTOLIC BLOOD PRESSURE: 106 MMHG | SYSTOLIC BLOOD PRESSURE: 158 MMHG | HEART RATE: 76 BPM | BODY MASS INDEX: 31.92 KG/M2 | OXYGEN SATURATION: 97 % | HEIGHT: 66 IN | RESPIRATION RATE: 16 BRPM | TEMPERATURE: 98.2 F

## 2019-03-11 DIAGNOSIS — I10 BENIGN ESSENTIAL HTN: ICD-10-CM

## 2019-03-11 DIAGNOSIS — Z12.39 SPECIAL SCREENING EXAMINATION FOR NEOPLASM OF BREAST: ICD-10-CM

## 2019-03-11 DIAGNOSIS — Z23 ENCOUNTER FOR IMMUNIZATION: ICD-10-CM

## 2019-03-11 DIAGNOSIS — R10.9 RIGHT FLANK PAIN: ICD-10-CM

## 2019-03-11 DIAGNOSIS — E55.9 VITAMIN D DEFICIENCY: ICD-10-CM

## 2019-03-11 DIAGNOSIS — Z13.31 SCREENING FOR DEPRESSION: ICD-10-CM

## 2019-03-11 DIAGNOSIS — Z00.00 MEDICARE ANNUAL WELLNESS VISIT, SUBSEQUENT: Primary | ICD-10-CM

## 2019-03-11 LAB
ALBUMIN SERPL-MCNC: 3.9 G/DL (ref 3.4–5)
ALBUMIN/GLOB SERPL: 1 {RATIO} (ref 0.8–1.7)
ALP SERPL-CCNC: 113 U/L (ref 45–117)
ALT SERPL-CCNC: 20 U/L (ref 13–56)
ANION GAP SERPL CALC-SCNC: 9 MMOL/L (ref 3–18)
AST SERPL-CCNC: 20 U/L (ref 15–37)
BASOPHILS # BLD: 0 K/UL (ref 0–0.1)
BASOPHILS NFR BLD: 0 % (ref 0–2)
BILIRUB SERPL-MCNC: 0.5 MG/DL (ref 0.2–1)
BUN SERPL-MCNC: 11 MG/DL (ref 7–18)
BUN/CREAT SERPL: 9 (ref 12–20)
CALCIUM SERPL-MCNC: 9 MG/DL (ref 8.5–10.1)
CHLORIDE SERPL-SCNC: 107 MMOL/L (ref 100–108)
CHOLEST SERPL-MCNC: 187 MG/DL
CO2 SERPL-SCNC: 24 MMOL/L (ref 21–32)
CREAT SERPL-MCNC: 1.2 MG/DL (ref 0.6–1.3)
DIFFERENTIAL METHOD BLD: ABNORMAL
EOSINOPHIL # BLD: 0.1 K/UL (ref 0–0.4)
EOSINOPHIL NFR BLD: 1 % (ref 0–5)
ERYTHROCYTE [DISTWIDTH] IN BLOOD BY AUTOMATED COUNT: 17.7 % (ref 11.6–14.5)
GLOBULIN SER CALC-MCNC: 3.8 G/DL (ref 2–4)
GLUCOSE SERPL-MCNC: 95 MG/DL (ref 74–99)
HBA1C MFR BLD: 5.9 % (ref 4.2–5.6)
HCT VFR BLD AUTO: 36.3 % (ref 35–45)
HDLC SERPL-MCNC: 62 MG/DL (ref 40–60)
HDLC SERPL: 3 {RATIO} (ref 0–5)
HGB BLD-MCNC: 11.1 G/DL (ref 12–16)
LDLC SERPL CALC-MCNC: 107.2 MG/DL (ref 0–100)
LIPID PROFILE,FLP: ABNORMAL
LYMPHOCYTES # BLD: 2 K/UL (ref 0.9–3.6)
LYMPHOCYTES NFR BLD: 24 % (ref 21–52)
MCH RBC QN AUTO: 24.1 PG (ref 24–34)
MCHC RBC AUTO-ENTMCNC: 30.6 G/DL (ref 31–37)
MCV RBC AUTO: 78.9 FL (ref 74–97)
MONOCYTES # BLD: 0.6 K/UL (ref 0.05–1.2)
MONOCYTES NFR BLD: 7 % (ref 3–10)
NEUTS SEG # BLD: 5.7 K/UL (ref 1.8–8)
NEUTS SEG NFR BLD: 68 % (ref 40–73)
PLATELET # BLD AUTO: 194 K/UL (ref 135–420)
POTASSIUM SERPL-SCNC: 4 MMOL/L (ref 3.5–5.5)
PROT SERPL-MCNC: 7.7 G/DL (ref 6.4–8.2)
RBC # BLD AUTO: 4.6 M/UL (ref 4.2–5.3)
SODIUM SERPL-SCNC: 140 MMOL/L (ref 136–145)
TRIGL SERPL-MCNC: 89 MG/DL (ref ?–150)
TSH SERPL DL<=0.05 MIU/L-ACNC: 2.4 UIU/ML (ref 0.36–3.74)
VLDLC SERPL CALC-MCNC: 17.8 MG/DL
WBC # BLD AUTO: 8.4 K/UL (ref 4.6–13.2)

## 2019-03-11 PROCEDURE — 36415 COLL VENOUS BLD VENIPUNCTURE: CPT

## 2019-03-11 PROCEDURE — 82306 VITAMIN D 25 HYDROXY: CPT

## 2019-03-11 PROCEDURE — 80053 COMPREHEN METABOLIC PANEL: CPT

## 2019-03-11 PROCEDURE — 84439 ASSAY OF FREE THYROXINE: CPT

## 2019-03-11 PROCEDURE — 80061 LIPID PANEL: CPT

## 2019-03-11 PROCEDURE — 83036 HEMOGLOBIN GLYCOSYLATED A1C: CPT

## 2019-03-11 PROCEDURE — 85025 COMPLETE CBC W/AUTO DIFF WBC: CPT

## 2019-03-11 PROCEDURE — 84443 ASSAY THYROID STIM HORMONE: CPT

## 2019-03-11 RX ORDER — METAPROTERENOL SULFATE 20 MG
TABLET ORAL
COMMUNITY
End: 2021-11-29

## 2019-03-11 RX ORDER — LOSARTAN POTASSIUM 100 MG/1
100 TABLET ORAL DAILY
Qty: 90 TAB | Refills: 3 | Status: CANCELLED | OUTPATIENT
Start: 2019-03-11

## 2019-03-11 RX ORDER — DIAPER,BRIEF,ADULT, DISPOSABLE
500 EACH MISCELLANEOUS DAILY
COMMUNITY

## 2019-03-11 NOTE — PROGRESS NOTES
Kavita Escalante is a 68 y.o. female and presents for annual Medicare Wellness Visit. Problem List: Reviewed with patient and discussed risk factors. Patient Active Problem List   Diagnosis Code    Benign essential HTN I10    Obesity, Class I, BMI 30-34.9 E66.9    Sciatica of left side M54.32    Post herpetic neuralgia B02.29    Memory loss R41.3    Pain of left hip joint M25.552    Osteopenia M85.80    Kidney disease, chronic, stage III (GFR 30-59 ml/min) ([facility]) N18.3    Elevated hemoglobin A1c R73.09    White coat syndrome with hypertension I10    Prediabetes R73.03       Current medical providers:  Patient Care Team:  Lauren Shanks MD as PCP - General (Internal Medicine)  Christina Flores MD (Surgery)    PSH: Reviewed with patient  Past Surgical History:   Procedure Laterality Date    APPENDECTOMY      CHOLECYSTOENTEROSTOMY      HX TONSIL AND ADENOIDECTOMY          SH: Reviewed with patient  Social History     Tobacco Use    Smoking status: Never Smoker    Smokeless tobacco: Never Used   Substance Use Topics    Alcohol use: Yes     Comment: socially    Drug use: No       FH: Reviewed with patient  Family History   Problem Relation Age of Onset    Hypertension Sister     Breast Cancer Sister        Medications/Allergies: Reviewed with patient  Current Outpatient Medications on File Prior to Visit   Medication Sig Dispense Refill    Iemzurjh-Clop-Ezzlxd-Hyalur Ac 165-052-82-2 mg cap Take  by mouth.  lysine (L-LYSINE) 500 mg tab tablet Take  by mouth three (3) times daily (with meals).  CHOLECALCIFEROL, VITAMIN D3, (VITAMIN D3 PO) Take  by mouth.  losartan (COZAAR) 100 mg tablet Take 1 Tab by mouth daily. 90 Tab 3    multivitamin (ONE A DAY) tablet Take 1 Tab by mouth daily.  chlorthalidone (HYGROTEN) 25 mg tablet Take 1 Tab by mouth daily. 90 Tab 3     No current facility-administered medications on file prior to visit.        Allergies   Allergen Reactions    Amlodipine Rash       Objective:  Visit Vitals  BP (!) 158/106 (BP 1 Location: Right arm, BP Patient Position: Sitting) Comment: manual   Pulse 76   Temp 98.2 °F (36.8 °C) (Oral)   Resp 16   Ht 5' 6\" (1.676 m)   Wt 198 lb 9.6 oz (90.1 kg)   SpO2 97%   BMI 32.05 kg/m²    Body mass index is 32.05 kg/m². Assessment of cognitive impairment: Alert and oriented x 3  Depression Screen:   3 most recent PHQ Screens 3/11/2019   Little interest or pleasure in doing things Not at all   Feeling down, depressed, irritable, or hopeless Not at all   Total Score PHQ 2 0     Fall Risk Assessment:    Fall Risk Assessment, last 12 mths 8/9/2017   Able to walk? Yes   Fall in past 12 months? Yes   Fall with injury? Yes   Number of falls in past 12 months 4   Fall Risk Score 5     Functional Ability:   Does the patient exhibit a steady gait? yes   How long did it take the patient to get up and walk from a sitting position? 4 seconds   Is the patient self reliant?  (ie can do own laundry, meals, household chores)  yes   Does the patient handle his/her own medications? yes   Does the patient handle his/her own money? yes   Is the patients home safe (ie good lighting, handrails on stairs and bath, etc.)? yes   Did you notice or did patient express any hearing difficulties? no   Did you notice of did patient express any vision difficulties? Yes. Saw eye dr. Catina Ortega get new glasses this week   Were distance and reading eye charts used?   yes     Advance Care Planning:   Patient was offered the opportunity to discuss advance care planning:  yes     Does patient have an Advance Directive:  yes   If no, did you provide information on Caring Connections?  no     Plan:    Orders Placed This Encounter    Depression Screen Annual    CLARIBEL MAMMO BI SCREENING INCL CAD    XR RIBS RT UNI 2 V    Influenza virus vaccine (FLUZONE HIGH-DOSE) 65 years and older    VITAMIN D, 25 HYDROXY    METABOLIC PANEL, COMPREHENSIVE    HEMOGLOBIN A1C W/O EAG    CBC WITH AUTOMATED DIFF    LIPID PANEL    MyChart Blood Pressure Flowsheet    Jjfbvaxb-Elcm-Owpfvo-Hyalur Ac 332-639-11-2 mg cap    lysine (L-LYSINE) 500 mg tab tablet    pneumococcal 23-valent (PNEUMOVAX 23) 25 mcg/0.5 mL injection     Health Maintenance   Topic Date Due    Pneumococcal 65+ Low/Medium Risk (2 of 2 - PPSV23) 12/30/2016    Influenza Age 5 to Adult  08/01/2018    BREAST CANCER SCRN MAMMOGRAM  10/19/2018    Shingrix Vaccine Age 50> (1 of 2) 06/16/2019 (Originally 12/17/1995)    FOBT Q 1 YEAR, 18+  05/24/2019    MEDICARE YEARLY EXAM  03/11/2020    GLAUCOMA SCREENING Q2Y  02/26/2021    DTaP/Tdap/Td series (2 - Td) 12/30/2025    COLONOSCOPY  12/27/2027    Hepatitis C Screening  Completed    Bone Densitometry (Dexa) Screening  Completed     *Patient verbalized understanding and agreement with the plan. A copy of the After Visit Summary with personalized health plan was given to the patient today. Follow-up Disposition:  Return in about 4 weeks (around 4/8/2019) for Pain, Hypertension, Go over lab/imaging results. Delicia Yanes.  Shannen Wang MD - Internal Medicine  3/11/2019, 2:33 PM  [facility]  [address] [place], [address]  Phone [phone]  Fax [phone]

## 2019-03-11 NOTE — PROGRESS NOTES
Internal Medicine Progress Note    Today's Date:  3/11/2019   Patient:  Nicholas Jolley  Patient :  1945    Subjective:     Chief Complaint   Patient presents with    Hypertension    Flank Pain    Annual Wellness Visit      Hypertension   This is a chronic problem. BP is not at goal. Pt is supposed to be on losartan and chlorthalidone. She ran out of losartan 2-3 wks ago. Toprol caused chest pressure. Pt was on norvasc previously and does not want to restart. Right flank pain  This is a new problem. This is not controlled. This has been present for 2-3 wks. Pt felt dizzy and fell. Past Medical History:   Diagnosis Date    Acute midline low back pain with left-sided sciatica 10/5/2016    Benign essential HTN 2015    CKD (chronic kidney disease), stage III (HCC) 2015    Elevated hemoglobin A1c 2016    Kidney disease, chronic, stage III (GFR 30-59 ml/min) ([facility] [address] 2016    Memory loss 2016    Obesity, Class I, BMI 30-34.9 2015    Osteopenia 10/19/2016    Pain of left hip joint 10/5/2016    Post herpetic neuralgia 2015    Prediabetes 2017    Sciatica of left side 2015    White coat syndrome with hypertension 2017     Past Surgical History:   Procedure Laterality Date    APPENDECTOMY      CHOLECYSTOENTEROSTOMY      HX TONSIL AND ADENOIDECTOMY        reports that  has never smoked. she has never used smokeless tobacco. She reports that she drinks alcohol. She reports that she does not use drugs.   Family History   Problem Relation Age of Onset    Hypertension Sister     Breast Cancer Sister      Allergies   Allergen Reactions    Amlodipine Rash     Review of Systems   Positives in bold  CV:      chest pain, palpitations  PULM:  SOB, wheezing, cough, sputum production    Current Outpatient Meds and Allergies     Current Outpatient Medications on File Prior to Visit   Medication Sig Dispense Refill    Voczvlos-Dxjr-Kbekgo-Hyalur Ac 121-658-27-2 mg cap Take  by mouth.  lysine (L-LYSINE) 500 mg tab tablet Take  by mouth three (3) times daily (with meals).  CHOLECALCIFEROL, VITAMIN D3, (VITAMIN D3 PO) Take  by mouth.  losartan (COZAAR) 100 mg tablet Take 1 Tab by mouth daily. 90 Tab 3    multivitamin (ONE A DAY) tablet Take 1 Tab by mouth daily.  chlorthalidone (HYGROTEN) 25 mg tablet Take 1 Tab by mouth daily. 90 Tab 3     No current facility-administered medications on file prior to visit. Allergies   Allergen Reactions    Amlodipine Rash     Objective:     VS:    Visit Vitals  BP (!) 158/106 (BP 1 Location: Right arm, BP Patient Position: Sitting) Comment: manual   Pulse 76   Temp 98.2 °F (36.8 °C) (Oral)   Resp 16   Ht 5' 6\" (1.676 m)   Wt 198 lb 9.6 oz (90.1 kg)   SpO2 97%   BMI 32.05 kg/m²     General:   Well-nourished, well-groomed, pleasant, alert, in no acute distress  Head:  Normocephalic, atraumatic  Ears:  External ears WNL  Nose:  External nares WNL  Psych:  No pressured speech, no abnormal thought content    3 most recent PHQ Screens 3/11/2019   Little interest or pleasure in doing things Not at all   Feeling down, depressed, irritable, or hopeless Not at all   Total Score PHQ 2 0     No visits with results within 3 Month(s) from this visit.    Latest known visit with results is:   Hospital Outpatient Visit on 12/27/2017   Component Date Value Ref Range Status    Sodium 12/27/2017 141  136 - 145 mmol/L Final    Potassium 12/27/2017 4.3  3.5 - 5.5 mmol/L Final    Chloride 12/27/2017 106  100 - 108 mmol/L Final    CO2 12/27/2017 29  21 - 32 mmol/L Final    Anion gap 12/27/2017 6  3.0 - 18 mmol/L Final    Glucose 12/27/2017 90  74 - 99 mg/dL Final    BUN 12/27/2017 12  7.0 - 18 MG/DL Final    Creatinine 12/27/2017 1.18  0.6 - 1.3 MG/DL Final    BUN/Creatinine ratio 12/27/2017 10* 12 - 20   Final    GFR est AA 12/27/2017 55* >60 ml/min/1.73m2 Final    GFR est non-AA 12/27/2017 45* >60 ml/min/1.73m2 Final    Comment: (NOTE)  Estimated GFR is calculated using the Modification of Diet in Renal   Disease (MDRD) Study equation, reported for both  Americans   (GFRAA) and non- Americans (GFRNA), and normalized to 1.73m2   body surface area. The physician must decide which value applies to   the patient. The MDRD study equation should only be used in   individuals age 25 or older. It has not been validated for the   following: pregnant women, patients with serious comorbid conditions,   or on certain medications, or persons with extremes of body size,   muscle mass, or nutritional status.  Calcium 12/27/2017 9.4  8.5 - 10.1 MG/DL Final    Hemoglobin A1c 12/27/2017 5.8* 4.2 - 5.6 % Final    Comment: (NOTE)  HbA1C Interpretive Ranges  <5.7              Normal  5.7 - 6.4         Consider Prediabetes  >6.5              Consider Diabetes       Assessment/Plan & Orders:         ICD-10-CM ICD-9-CM    1. Medicare annual wellness visit, subsequent Z00.00 V70.0    2. Benign essential HTN V26 700.4 METABOLIC PANEL, COMPREHENSIVE      HEMOGLOBIN A1C W/O EAG      CBC WITH AUTOMATED DIFF      LIPID PANEL      [facility]   3. Right flank pain R10.9 789.09 XR RIBS RT UNI 2 V   4. Vitamin D deficiency E55.9 268.9 VITAMIN D, 25 HYDROXY   5. Encounter for immunization Z23 V03.89 INFLUENZA VIRUS VACCINE, HIGH DOSE SEASONAL, PRESERVATIVE FREE      pneumococcal 23-valent (PNEUMOVAX 23) 25 mcg/0.5 mL injection   6. Special screening examination for neoplasm of breast Z12.31 V76.10 CLARIBEL MAMMO BI SCREENING INCL CAD   7. Screening for depression Z13.31 V79.0 DEPRESSION SCREEN ANNUAL      Will refill losartan upon review of labs  Depression screening: 3/11/19    Follow-up Disposition:  Return in about 4 weeks (around 4/8/2019) for Pain, Hypertension, Go over lab/imaging results. *Patient verbalized understanding and agreement with the plan. Patient was given an after-visit summary. Luciana Schwab.  Boaz Keyes MD - Internal Medicine  3/11/2019, 8:17 AM  [facility]  [address] [place], [address]  Phone [phone]  Fax [phone]

## 2019-03-11 NOTE — PATIENT INSTRUCTIONS
Vaccine Information Statement    Influenza (Flu) Vaccine (Inactivated or Recombinant): What you need to know    Many Vaccine Information Statements are available in [place]ian and other languages. See www.immunize.org/vis  Hojas de Información Sobre Vacunas están disponibles en Español y en muchos otros idiomas. Visite www.immunize.org/vis    1. Why get vaccinated? Influenza (flu) is a contagious disease that spreads around the [place] every year, usually between October and May. Flu is caused by influenza viruses, and is spread mainly by coughing, sneezing, and close contact. Anyone can get flu. Flu strikes suddenly and can last several days. Symptoms vary by age, but can include:   fever/chills   sore throat   muscle aches   fatigue   cough   headache    runny or stuffy nose    Flu can also lead to pneumonia and blood infections, and cause diarrhea and seizures in children. If you have a medical condition, such as heart or lung disease, flu can make it worse. Flu is more dangerous for some people. Infants and young children, people 72years of age and older, pregnant women, and people with certain health conditions or a weakened immune system are at greatest risk. Each year thousands of people in the [facility] die from flu, and many more are hospitalized. Flu vaccine can:   keep you from getting flu,   make flu less severe if you do get it, and   keep you from spreading flu to your family and other people. 2. Inactivated and recombinant flu vaccines    A dose of flu vaccine is recommended every flu season. Children 6 months through 6years of age may need two doses during the same flu season. Everyone else needs only one dose each flu season.        Some inactivated flu vaccines contain a very small amount of a mercury-based preservative called thimerosal. Studies have not shown thimerosal in vaccines to be harmful, but flu vaccines that do not contain thimerosal are available. There is no live flu virus in flu shots. They cannot cause the flu. There are many flu viruses, and they are always changing. Each year a new flu vaccine is made to protect against three or four viruses that are likely to cause disease in the upcoming flu season. But even when the vaccine doesnt exactly match these viruses, it may still provide some protection    Flu vaccine cannot prevent:   flu that is caused by a virus not covered by the vaccine, or   illnesses that look like flu but are not. It takes about 2 weeks for protection to develop after vaccination, and protection lasts through the flu season. 3. Some people should not get this vaccine    Tell the person who is giving you the vaccine:     If you have any severe, life-threatening allergies. If you ever had a life-threatening allergic reaction after a dose of flu vaccine, or have a severe allergy to any part of this vaccine, you may be advised not to get vaccinated. Most, but not all, types of flu vaccine contain a small amount of egg protein.  If you ever had Guillain-Barré Syndrome (also called GBS). Some people with a history of GBS should not get this vaccine. This should be discussed with your doctor.  If you are not feeling well. It is usually okay to get flu vaccine when you have a mild illness, but you might be asked to come back when you feel better. 4. Risks of a vaccine reaction    With any medicine, including vaccines, there is a chance of reactions. These are usually mild and go away on their own, but serious reactions are also possible. Most people who get a flu shot do not have any problems with it.      Minor problems following a flu shot include:    soreness, redness, or swelling where the shot was given     hoarseness   sore, red or itchy eyes   cough   fever   aches   headache   itching   fatigue  If these problems occur, they usually begin soon after the shot and last 1 or 2 days. More serious problems following a flu shot can include the following:     There may be a small increased risk of Guillain-Barré Syndrome (GBS) after inactivated flu vaccine. This risk has been estimated at 1 or 2 additional cases per million people vaccinated. This is much lower than the risk of severe complications from flu, which can be prevented by flu vaccine.  Young children who get the flu shot along with pneumococcal vaccine (PCV13) and/or DTaP vaccine at the same time might be slightly more likely to have a seizure caused by fever. Ask your doctor for more information. Tell your doctor if a child who is getting flu vaccine has ever had a seizure. Problems that could happen after any injected vaccine:      People sometimes faint after a medical procedure, including vaccination. Sitting or lying down for about 15 minutes can help prevent fainting, and injuries caused by a fall. Tell your doctor if you feel dizzy, or have vision changes or ringing in the ears.  Some people get severe pain in the shoulder and have difficulty moving the arm where a shot was given. This happens very rarely.  Any medication can cause a severe allergic reaction. Such reactions from a vaccine are very rare, estimated at about 1 in a million doses, and would happen within a few minutes to a few hours after the vaccination. As with any medicine, there is a very remote chance of a vaccine causing a serious injury or death. The safety of vaccines is always being monitored. For more information, visit: www.cdc.gov/vaccinesafety/    5. What if there is a serious reaction? What should I look for?  Look for anything that concerns you, such as signs of a severe allergic reaction, very high fever, or unusual behavior.     Signs of a severe allergic reaction can include hives, swelling of the face and throat, difficulty breathing, a fast heartbeat, dizziness, and weakness  usually within a few minutes to a few hours after the vaccination. What should I do?  If you think it is a severe allergic reaction or other emergency that cant wait, call 9-1-1 and get the person to the nearest hospital. Otherwise, call your doctor.  Reactions should be reported to the Vaccine Adverse Event Reporting System (VAERS). Your doctor should file this report, or you can do it yourself through  the VAERS web site at www.vaers. [facility].gov, or by calling 5-681.483.6987. VAERS does not give medical advice. 6. The National Vaccine Injury Compensation Program    The [facility] Vaccine Injury Compensation Program (VICP) is a federal program that was created to compensate people who may have been injured by certain vaccines. Persons who believe they may have been injured by a vaccine can learn about the program and about filing a claim by calling 3-128.556.6265 or visiting the Namely website at www.[facility].gov/vaccinecompensation. There is a time limit to file a claim for compensation. 7. How can I learn more?  Ask your healthcare provider. He or she can give you the vaccine package insert or suggest other sources of information.  Call your local or state health department.  Contact the Centers for Disease Control and Prevention (CDC):  - Call 5-409.561.5892 (1-800-CDC-INFO) or  - Visit CDCs website at www.cdc.gov/flu    Vaccine Information Statement   Inactivated Influenza Vaccine   8/7/2015  42 UMoises Kimbrough [id number]-30    Department of Health and Human Services  Centers for Disease Control and Prevention    Office Use Only    Medicare Wellness Visit, Female     The best way to live healthy is to have a lifestyle where you eat a well-balanced diet, exercise regularly, limit alcohol use, and quit all forms of tobacco/nicotine, if applicable. Regular preventive services are another way to keep healthy.  Preventive services (vaccines, screening tests, monitoring & exams) can help personalize your care plan, which helps you manage your own care. Screening tests can find health problems at the earliest stages, when they are easiest to treat. [facility] follows the current, evidence-based guidelines published by the [facility] Isma Crowder ([facility]) when recommending preventive services for our patients. Because we follow these guidelines, sometimes recommendations change over time as research supports it. (For example, mammograms used to be recommended annually. Even though Medicare will still pay for an annual mammogram, the newer guidelines recommend a mammogram every two years for women of average risk.)  Of course, you and your doctor may decide to screen more often for some diseases, based on your risk and your health status. Preventive services for you include:  - Medicare offers their members a free annual wellness visit, which is time for you and your primary care provider to discuss and plan for your preventive service needs. Take advantage of this benefit every year!  -All adults over the age of 72 should receive the recommended pneumonia vaccines. Current USPSTF guidelines recommend a series of two vaccines for the best pneumonia protection.   -All adults should have a flu vaccine yearly and a tetanus vaccine every 10 years. All adults age 61 and older should receive a shingles vaccine once in their lifetime.    -A bone mass density test is recommended when a woman turns 65 to screen for osteoporosis. This test is only recommended one time, as a screening. Some providers will use this same test as a disease monitoring tool if you already have osteoporosis.   -All adults age 38-68 who are overweight should have a diabetes screening test once every three years.   -Other screening tests and preventive services for persons with diabetes include: an eye exam to screen for diabetic retinopathy, a kidney function test, a foot exam, and stricter control over your cholesterol.   -Cardiovascular screening for adults with routine risk involves an electrocardiogram (ECG) at intervals determined by your doctor.   -Colorectal cancer screenings should be done for adults age 54-65 with no increased risk factors for colorectal cancer. There are a number of acceptable methods of screening for this type of cancer. Each test has its own benefits and drawbacks. Discuss with your doctor what is most appropriate for you during your annual wellness visit. The different tests include: colonoscopy (considered the best screening method), a fecal occult blood test, a fecal DNA test, and sigmoidoscopy. -Breast cancer screenings are recommended every other year for women of normal risk, age 54-69.  -Cervical cancer screenings for women over age 72 are only recommended with certain risk factors.   -All adults born between [facility] should be screened once for Hepatitis C.      Here is a list of your current Health Maintenance items (your personalized list of preventive services) with a due date:  Health Maintenance Due   Topic Date Due    Pneumococcal Vaccine (2 of 2 - PPSV23) 12/30/2016    Flu Vaccine  08/01/2018    Annual Well Visit  08/10/2018    Mammogram  10/19/2018

## 2019-03-11 NOTE — PROGRESS NOTES
Sylwia Barker is a 68 y.o. female (: 1945) presenting to address:    Chief Complaint   Patient presents with    Hypertension    Cholesterol Problem    Annual Wellness Visit     Pain in rt side occurred after a fall 5-6 weeks ago. Pain has been present since injury. Pain is an aching pain that radiates to back on rt side. No problems urinating. Pain is made better with Aleve. Pain worse with walking. Has effected Pt's activity level; normally walks ~3mi a day    Glaucoma screening conducted during eye exam 2 wks ago. Medication list and allergies have been reviewed with [facility]wia [facility] and updated as of today's date. I have gone over all Medical, Surgical and Social History with [facility] and updated/added the information accordingly. Visual Acuity Screening    Right eye Left eye Both eyes   Without correction: 20/200 20/200 20/200   With correction: 20/70 20/200 20/50         1. Have you been to the ER, Urgent Care or Hospitalized since your last visit? NO      2. Have you followed up with your PCP or any other Physicians since your procedure/ last office visit?    NO  3 most recent PHQ Screens 3/11/2019   Little interest or pleasure in doing things Not at all   Feeling down, depressed, irritable, or hopeless Not at all   Total Score PHQ 2 0         VORB: No orders of the defined types were placed in this encounter.  /Trisha Del Cid MD/Xander Merritt

## 2019-03-12 DIAGNOSIS — I10 BENIGN ESSENTIAL HTN: ICD-10-CM

## 2019-03-12 LAB
25(OH)D3 SERPL-MCNC: 37 NG/ML (ref 30–100)
T4 FREE SERPL-MCNC: 1.2 NG/DL (ref 0.7–1.5)

## 2019-03-12 RX ORDER — LOSARTAN POTASSIUM 100 MG/1
100 TABLET ORAL DAILY
Qty: 90 TAB | Refills: 3 | Status: CANCELLED | OUTPATIENT
Start: 2019-03-12

## 2019-03-15 ENCOUNTER — PATIENT MESSAGE (OUTPATIENT)
Dept: FAMILY MEDICINE CLINIC | Facility: CLINIC | Age: 74
End: 2019-03-15

## 2019-03-27 ENCOUNTER — HOSPITAL ENCOUNTER (OUTPATIENT)
Dept: MAMMOGRAPHY | Age: 74
Discharge: HOME | End: 2019-03-27
Attending: INTERNAL MEDICINE
Payer: MEDICARE

## 2019-03-27 DIAGNOSIS — Z12.31 VISIT FOR SCREENING MAMMOGRAM: ICD-10-CM

## 2019-03-27 DIAGNOSIS — Z12.39 SPECIAL SCREENING EXAMINATION FOR NEOPLASM OF BREAST: ICD-10-CM

## 2019-03-27 PROCEDURE — 77063 BREAST TOMOSYNTHESIS BI: CPT

## 2019-04-15 LAB — HEMOCCULT STL QL: NEGATIVE

## 2019-04-30 ENCOUNTER — OFFICE VISIT (OUTPATIENT)
Dept: FAMILY MEDICINE CLINIC | Facility: CLINIC | Age: 74
End: 2019-04-30

## 2019-04-30 VITALS
HEART RATE: 80 BPM | OXYGEN SATURATION: 98 % | SYSTOLIC BLOOD PRESSURE: 140 MMHG | RESPIRATION RATE: 15 BRPM | WEIGHT: 201 LBS | TEMPERATURE: 97.9 F | BODY MASS INDEX: 32.3 KG/M2 | HEIGHT: 66 IN | DIASTOLIC BLOOD PRESSURE: 90 MMHG

## 2019-04-30 DIAGNOSIS — N18.30 KIDNEY DISEASE, CHRONIC, STAGE III (GFR 30-59 ML/MIN) (HCC): ICD-10-CM

## 2019-04-30 DIAGNOSIS — I10 BENIGN ESSENTIAL HTN: Primary | ICD-10-CM

## 2019-04-30 DIAGNOSIS — E66.9 OBESITY, CLASS I, BMI 30-34.9: ICD-10-CM

## 2019-04-30 RX ORDER — HYDROCHLOROTHIAZIDE 25 MG/1
25 TABLET ORAL DAILY
Qty: 90 TAB | Refills: 3 | Status: SHIPPED | OUTPATIENT
Start: 2019-04-30 | End: 2020-10-15

## 2019-04-30 NOTE — PATIENT INSTRUCTIONS
Learning About Low-Carbohydrate Diets for Weight Loss What is a low-carbohydrate diet? Low-carb diets avoid foods that are high in carbohydrate. These high-carb foods include pasta, bread, rice, cereal, fruits, and starchy vegetables. Instead, these diets usually have you eat foods that are high in fat and protein. Many people lose weight quickly on a low-carb diet. But the early weight loss is water. People on this diet often gain the weight back after they start eating carbs again. Not all diet plans are safe or work well. A lot of the evidence shows that low-carb diets aren't healthy. That's because these diets often don't include healthy foods like fruits and vegetables. Losing weight safely means balancing protein, fat, and carbs with every meal and snack. And low-carb diets don't always provide the vitamins, minerals, and fiber you need. If you have a serious medical condition, talk to your doctor before you try any diet. These conditions include kidney disease, heart disease, type 2 diabetes, high cholesterol, and high blood pressure. If you are pregnant, it may not be safe for your baby if you are on a low-carb diet. How can you lose weight safely? You might have heard that a diet plan helped another person lose weight. But that doesn't mean that it will work for you. It is very hard to stay on a diet that includes lots of big changes in your eating habits. If you want to get to a healthy weight and stay there, making healthy lifestyle changes will often work better than dieting. These steps can help. · Make a plan for change. Work with your doctor to create a plan that is right for you. · See a dietitian. He or she can show you how to make healthy changes in your eating habits. · Manage stress. If you have a lot of stress in your life, it can be hard to focus on making healthy changes to your daily habits. · Track your food and activity.  You are likely to do better at losing weight if you keep track of what you eat and what you do. Follow-up care is a key part of your treatment and safety. Be sure to make and go to all appointments, and call your doctor if you are having problems. It's also a good idea to know your test results and keep a list of the medicines you take. Where can you learn more? Go to http://rick-kal.info/. Enter A121 in the search box to learn more about \"Learning About Low-Carbohydrate Diets for Weight Loss. \" Current as of: March 28, 2018 Content Version: 11.9 © 3345-3668 [employer]. Care instructions adapted under license by [employer] (which disclaims liability or warranty for this information). If you have questions about a medical condition or this instruction, always ask your healthcare professional. [address] any warranty or liability for your use of this information.

## 2019-04-30 NOTE — PROGRESS NOTES
Flor Caicedo is a 68 y.o.  female presents today for office visit for follow up. Pt would also like to discuss HTN. Pt is not fasting. Pt is in Room # 2 1. Have you been to the ER, urgent care clinic since your last visit? Hospitalized since your last visit? No 
 
2. Have you seen or consulted any other health care providers outside of the [address] [place] [address] since your last visit? Include any pap smears or colon screening. No 
 
Upcoming Appts 
none Health Maintenance reviewed VORB: No orders of the defined types were placed in this encounter.  
Farhat De Souza, Yoni Degroot, RICHN

## 2019-04-30 NOTE — PROGRESS NOTES
Internal Medicine Progress Note Today's Date:  2019 Patient:  Johnna Brooks Patient :  1945 Subjective: Chief Complaint Patient presents with  Hypertension  Results  Dizziness  Fall  
  month ago Hypertension This is a chronic problem. BP is not at goal. Pt is on losartan. Pt is not on chlorthalidone as previously thought. Obesity class I This is a chronic problem. This is not controlled. Pt is trying to lose weight. She exercises daily by walking. Pt gained weight since the last visit. 3 most recent PHQ Screens 2019 Little interest or pleasure in doing things Not at all Feeling down, depressed, irritable, or hopeless Not at all Total Score PHQ 2 0 Past Medical History:  
Diagnosis Date  Acute midline low back pain with left-sided sciatica 10/5/2016  Benign essential HTN 2015  CKD (chronic kidney disease), stage III ([facility] [address] 2015  Elevated hemoglobin A1c 2016  Kidney disease, chronic, stage III (GFR 30-59 ml/min) ([facility]) 2016  Memory loss 2016  Menopause  Obesity, Class I, BMI 30-34.9 2015  Osteopenia 10/19/2016  Pain of left hip joint 10/5/2016  Post herpetic neuralgia 2015  Prediabetes 2017  Sciatica of left side 2015  White coat syndrome with hypertension 2017 Past Surgical History:  
Procedure Laterality Date  APPENDECTOMY  CHOLECYSTOENTEROSTOMY  HX HYSTERECTOMY  HX OOPHORECTOMY  HX TONSIL AND ADENOIDECTOMY    
 
 reports that she has never smoked. She has never used smokeless tobacco. She reports that she drinks alcohol. She reports that she does not use drugs. Family History Problem Relation Age of Onset  Hypertension Sister  Breast Cancer Sister 68 Allergies Allergen Reactions  Amlodipine Rash Review of Systems CV:      chest pain, palpitations PULM:  SOB, wheezing, cough, sputum production Current Outpatient Meds and Allergies Current Outpatient Medications on File Prior to Visit Medication Sig Dispense Refill  losartan (COZAAR) 100 mg tablet TAKE ONE TABLET BY MOUTH ONE TIME DAILY 90 Tab 3  Srumgifs-Dzuj-Emdazi-Hyalur Ac [id number] mg cap Take  by mouth.  lysine (L-LYSINE) 500 mg tab tablet Take  by mouth three (3) times daily (with meals).  CHOLECALCIFEROL, VITAMIN D3, (VITAMIN D3 PO) Take  by mouth.  multivitamin (ONE A DAY) tablet Take 1 Tab by mouth daily. No current facility-administered medications on file prior to visit. Allergies Allergen Reactions  Amlodipine Rash Objective:    
 
Visit Vitals /90 (BP 1 Location: Left arm, BP Patient Position: Sitting) Pulse 80 Temp 97.9 °F (36.6 °C) (Oral) Resp 15 Ht 5' 6\" (1.676 m) Wt 201 lb (91.2 kg) SpO2 98% BMI 32.44 kg/m² General:   Well-nourished, well-groomed, pleasant, alert, in no acute distress Head:  Normocephalic, atraumatic Ears:  External ears WNL Nose:  External nares WNL Psych:  No pressured speech, no abnormal thought content Lab Results Component Value Date/Time Hemoglobin A1c 5.9 (H) 03/11/2019 11:48 AM  
 Hemoglobin A1c 5.8 (H) 12/27/2017 02:32 PM  
 Hemoglobin A1c 6.0 (H) 08/09/2017 03:04 PM  
 Glucose 95 03/11/2019 11:48 AM  
 LDL, calculated 107.2 (H) 03/11/2019 11:48 AM  
 Creatinine 1.20 03/11/2019 11:48 AM  
  
Assessment/Plan & Orders: ICD-10-CM ICD-9-CM 1. Benign essential HTN I10 401.1 hydroCHLOROthiazide (HYDRODIURIL) 25 mg tablet [facility] BP FLOWSHEET 2. Obesity, Class I, BMI 30-34.9 E66.9 278.00   
3. Kidney disease, chronic, stage III (GFR 30-59 ml/min) ([facility]) N18.3 585. 3 Control of BP may improve dizziness (this is positional and occurs upon waking up in the morning) Diet and exercise Follow-up and Dispositions · Return in about 1 month (around 5/28/2019) for Weight management, Hypertension, Chronic kidney disease. *Patient verbalized understanding and agreement with the plan. Patient was given an after-visit summary. Ana Ramirez. Deb Maradiaga MD - Internal Medicine 4/30/2019, 8:17 AM 
[address], [address] Phone [phone] Fax [phone]

## 2019-06-28 ENCOUNTER — HOSPITAL ENCOUNTER (OUTPATIENT)
Dept: CT IMAGING | Age: 74
Discharge: HOME | End: 2019-06-28
Attending: INTERNAL MEDICINE
Payer: SELF-PAY

## 2019-06-28 DIAGNOSIS — Z13.6 SCREENING FOR OTHER AND UNSPECIFIED CARDIOVASCULAR CONDITIONS: ICD-10-CM

## 2019-06-28 PROCEDURE — 75571 CT HRT W/O DYE W/CA TEST: CPT

## 2020-03-30 DIAGNOSIS — E55.9 VITAMIN D DEFICIENCY: Primary | ICD-10-CM

## 2020-03-30 DIAGNOSIS — I10 BENIGN ESSENTIAL HTN: ICD-10-CM

## 2020-03-30 DIAGNOSIS — R73.9 ELEVATED BLOOD SUGAR: ICD-10-CM

## 2020-03-30 RX ORDER — LOSARTAN POTASSIUM 100 MG/1
TABLET ORAL
Qty: 90 TAB | Refills: 3 | Status: CANCELLED | OUTPATIENT
Start: 2020-03-30

## 2020-04-07 DIAGNOSIS — I10 BENIGN ESSENTIAL HTN: ICD-10-CM

## 2020-04-07 RX ORDER — LOSARTAN POTASSIUM 100 MG/1
TABLET ORAL
Qty: 90 TAB | Refills: 0 | Status: SHIPPED | OUTPATIENT
Start: 2020-04-07 | End: 2020-09-14 | Stop reason: SDUPTHER

## 2020-09-17 ENCOUNTER — HOSPITAL ENCOUNTER (OUTPATIENT)
Dept: LAB | Age: 75
Discharge: HOME | End: 2020-09-17
Payer: MEDICARE

## 2020-09-17 ENCOUNTER — CLINICAL SUPPORT (OUTPATIENT)
Dept: FAMILY MEDICINE CLINIC | Facility: CLINIC | Age: 75
End: 2020-09-17

## 2020-09-17 VITALS — TEMPERATURE: 98 F

## 2020-09-17 DIAGNOSIS — E55.9 VITAMIN D DEFICIENCY: ICD-10-CM

## 2020-09-17 DIAGNOSIS — Z23 ENCOUNTER FOR IMMUNIZATION: Primary | ICD-10-CM

## 2020-09-17 DIAGNOSIS — I10 BENIGN ESSENTIAL HTN: ICD-10-CM

## 2020-09-17 DIAGNOSIS — R73.9 ELEVATED BLOOD SUGAR: ICD-10-CM

## 2020-09-17 LAB
25(OH)D3 SERPL-MCNC: 43.6 NG/ML (ref 30–100)
ALBUMIN SERPL-MCNC: 4.1 G/DL (ref 3.4–5)
ALBUMIN/GLOB SERPL: 1.1 {RATIO} (ref 0.8–1.7)
ALP SERPL-CCNC: 96 U/L (ref 45–117)
ALT SERPL-CCNC: 29 U/L (ref 13–56)
ANION GAP SERPL CALC-SCNC: 4 MMOL/L (ref 3–18)
AST SERPL-CCNC: 29 U/L (ref 10–38)
BASOPHILS # BLD: 0 K/UL (ref 0–0.1)
BASOPHILS NFR BLD: 0 % (ref 0–2)
BILIRUB SERPL-MCNC: 0.7 MG/DL (ref 0.2–1)
BUN SERPL-MCNC: 18 MG/DL (ref 7–18)
BUN/CREAT SERPL: 14 (ref 12–20)
CALCIUM SERPL-MCNC: 9.6 MG/DL (ref 8.5–10.1)
CHLORIDE SERPL-SCNC: 111 MMOL/L (ref 100–111)
CHOLEST SERPL-MCNC: 191 MG/DL
CO2 SERPL-SCNC: 26 MMOL/L (ref 21–32)
CREAT SERPL-MCNC: 1.3 MG/DL (ref 0.6–1.3)
DIFFERENTIAL METHOD BLD: ABNORMAL
EOSINOPHIL # BLD: 0.1 K/UL (ref 0–0.4)
EOSINOPHIL NFR BLD: 1 % (ref 0–5)
ERYTHROCYTE [DISTWIDTH] IN BLOOD BY AUTOMATED COUNT: 15.4 % (ref 11.6–14.5)
EST. AVERAGE GLUCOSE BLD GHB EST-MCNC: 117 MG/DL
GLOBULIN SER CALC-MCNC: 3.8 G/DL (ref 2–4)
GLUCOSE SERPL-MCNC: 101 MG/DL (ref 74–99)
HBA1C MFR BLD: 5.7 % (ref 4.2–5.6)
HCT VFR BLD AUTO: 45.1 % (ref 35–45)
HDLC SERPL-MCNC: 58 MG/DL (ref 40–60)
HDLC SERPL: 3.3 {RATIO} (ref 0–5)
HGB BLD-MCNC: 14.5 G/DL (ref 12–16)
LDLC SERPL CALC-MCNC: 117.2 MG/DL (ref 0–100)
LIPID PROFILE,FLP: ABNORMAL
LYMPHOCYTES # BLD: 2.4 K/UL (ref 0.9–3.6)
LYMPHOCYTES NFR BLD: 29 % (ref 21–52)
MCH RBC QN AUTO: 28 PG (ref 24–34)
MCHC RBC AUTO-ENTMCNC: 32.2 G/DL (ref 31–37)
MCV RBC AUTO: 87.1 FL (ref 74–97)
MONOCYTES # BLD: 0.7 K/UL (ref 0.05–1.2)
MONOCYTES NFR BLD: 8 % (ref 3–10)
NEUTS SEG # BLD: 5.1 K/UL (ref 1.8–8)
NEUTS SEG NFR BLD: 62 % (ref 40–73)
PLATELET # BLD AUTO: 232 K/UL (ref 135–420)
PMV BLD AUTO: 12.9 FL (ref 9.2–11.8)
POTASSIUM SERPL-SCNC: 4.7 MMOL/L (ref 3.5–5.5)
PROT SERPL-MCNC: 7.9 G/DL (ref 6.4–8.2)
RBC # BLD AUTO: 5.18 M/UL (ref 4.2–5.3)
SODIUM SERPL-SCNC: 141 MMOL/L (ref 136–145)
TRIGL SERPL-MCNC: 79 MG/DL (ref ?–150)
VLDLC SERPL CALC-MCNC: 15.8 MG/DL
WBC # BLD AUTO: 8.3 K/UL (ref 4.6–13.2)

## 2020-09-17 PROCEDURE — 83036 HEMOGLOBIN GLYCOSYLATED A1C: CPT

## 2020-09-17 PROCEDURE — 85025 COMPLETE CBC W/AUTO DIFF WBC: CPT

## 2020-09-17 PROCEDURE — 80053 COMPREHEN METABOLIC PANEL: CPT

## 2020-09-17 PROCEDURE — 36415 COLL VENOUS BLD VENIPUNCTURE: CPT

## 2020-09-17 PROCEDURE — 82306 VITAMIN D 25 HYDROXY: CPT

## 2020-09-17 PROCEDURE — 80061 LIPID PANEL: CPT

## 2020-09-17 NOTE — PROGRESS NOTES
Mariangel Fitch is a 76 y.o. female who presents for routine immunizations. She denies any symptoms , reactions or allergies that would exclude them from being immunized today. Risks and adverse reactions were discussed and the VIS was given to them. All questions were addressed. She was observed for 10 min post injection. There were no reactions observed.     Matty Lerma LPN

## 2020-09-22 DIAGNOSIS — I10 BENIGN ESSENTIAL HTN: ICD-10-CM

## 2020-09-22 RX ORDER — LOSARTAN POTASSIUM 100 MG/1
TABLET ORAL
Qty: 90 TAB | Refills: 3 | Status: CANCELLED | OUTPATIENT
Start: 2020-09-22

## 2020-10-14 ENCOUNTER — OFFICE VISIT (OUTPATIENT)
Dept: FAMILY MEDICINE CLINIC | Age: 75
End: 2020-10-14
Payer: MEDICARE

## 2020-10-14 VITALS
BODY MASS INDEX: 31.6 KG/M2 | OXYGEN SATURATION: 97 % | WEIGHT: 196.6 LBS | RESPIRATION RATE: 17 BRPM | HEIGHT: 66 IN | DIASTOLIC BLOOD PRESSURE: 90 MMHG | SYSTOLIC BLOOD PRESSURE: 133 MMHG | HEART RATE: 84 BPM | TEMPERATURE: 98 F

## 2020-10-14 DIAGNOSIS — N18.31 STAGE 3A CHRONIC KIDNEY DISEASE (HCC): ICD-10-CM

## 2020-10-14 DIAGNOSIS — Z12.31 ENCOUNTER FOR SCREENING MAMMOGRAM FOR MALIGNANT NEOPLASM OF BREAST: ICD-10-CM

## 2020-10-14 DIAGNOSIS — I10 BENIGN ESSENTIAL HTN: ICD-10-CM

## 2020-10-14 DIAGNOSIS — Z00.00 MEDICARE ANNUAL WELLNESS VISIT, SUBSEQUENT: Primary | ICD-10-CM

## 2020-10-14 DIAGNOSIS — R73.03 PREDIABETES: ICD-10-CM

## 2020-10-14 DIAGNOSIS — Z78.0 POSTMENOPAUSAL: ICD-10-CM

## 2020-10-14 DIAGNOSIS — Z13.31 SCREENING FOR DEPRESSION: ICD-10-CM

## 2020-10-14 DIAGNOSIS — E66.9 OBESITY, CLASS I, BMI 30-34.9: ICD-10-CM

## 2020-10-14 PROCEDURE — G8536 NO DOC ELDER MAL SCRN: HCPCS | Performed by: INTERNAL MEDICINE

## 2020-10-14 PROCEDURE — G0444 DEPRESSION SCREEN ANNUAL: HCPCS | Performed by: INTERNAL MEDICINE

## 2020-10-14 PROCEDURE — G8399 PT W/DXA RESULTS DOCUMENT: HCPCS | Performed by: INTERNAL MEDICINE

## 2020-10-14 PROCEDURE — 1090F PRES/ABSN URINE INCON ASSESS: CPT | Performed by: INTERNAL MEDICINE

## 2020-10-14 PROCEDURE — G8510 SCR DEP NEG, NO PLAN REQD: HCPCS | Performed by: INTERNAL MEDICINE

## 2020-10-14 PROCEDURE — G8417 CALC BMI ABV UP PARAM F/U: HCPCS | Performed by: INTERNAL MEDICINE

## 2020-10-14 PROCEDURE — G9899 SCRN MAM PERF RSLTS DOC: HCPCS | Performed by: INTERNAL MEDICINE

## 2020-10-14 PROCEDURE — G8755 DIAS BP > OR = 90: HCPCS | Performed by: INTERNAL MEDICINE

## 2020-10-14 PROCEDURE — 99214 OFFICE O/P EST MOD 30 MIN: CPT | Performed by: INTERNAL MEDICINE

## 2020-10-14 PROCEDURE — G8427 DOCREV CUR MEDS BY ELIG CLIN: HCPCS | Performed by: INTERNAL MEDICINE

## 2020-10-14 PROCEDURE — G0439 PPPS, SUBSEQ VISIT: HCPCS | Performed by: INTERNAL MEDICINE

## 2020-10-14 PROCEDURE — 3017F COLORECTAL CA SCREEN DOC REV: CPT | Performed by: INTERNAL MEDICINE

## 2020-10-14 PROCEDURE — 1101F PT FALLS ASSESS-DOCD LE1/YR: CPT | Performed by: INTERNAL MEDICINE

## 2020-10-14 PROCEDURE — G8752 SYS BP LESS 140: HCPCS | Performed by: INTERNAL MEDICINE

## 2020-10-14 NOTE — PROGRESS NOTES
Internal Medicine Progress Note    Today's Date:  10/15/2020   Patient:  Francee Hodgkins  Patient :  1945    Subjective:     Chief Complaint   Patient presents with    Rash    Hypertension    Chronic Kidney Disease    Annual Wellness Visit    LOW BACK PAIN      Hypertension/White coat hypertension  This is a chronic problem. BP is not at goal. Pt is on losartan. Pt reports compliance with this medication. Rash/Back pain  This is a new problem. This is controlled. Pt reports excruciating pain in her upper back. She also complains of a rash in her buttocks. She has a h/o shingles. CKD Stage 3  This is a chronic problem. This is not at goal. Creatinine was last checked on 2020. It was 1.3.      3 most recent PHQ Screens 10/14/2020   Little interest or pleasure in doing things Several days   Feeling down, depressed, irritable, or hopeless Several days   Total Score PHQ 2 2     Past Medical History:   Diagnosis Date    Acute midline low back pain with left-sided sciatica 10/5/2016    Benign essential HTN 2015    CKD (chronic kidney disease), stage III 2015    Elevated hemoglobin A1c 2016    Kidney disease, chronic, stage III (GFR 30-59 ml/min) 2016    Memory loss 2016    Menopause     Obesity, Class I, BMI 30-34.9 2015    Osteopenia 10/19/2016    Pain of left hip joint 10/5/2016    Post herpetic neuralgia 2015    Prediabetes 2017    Sciatica of left side 2015    White coat syndrome with hypertension 2017     Past Surgical History:   Procedure Laterality Date    APPENDECTOMY      CHOLECYSTOENTEROSTOMY      HX HYSTERECTOMY      HX OOPHORECTOMY      HX TONSIL AND ADENOIDECTOMY        reports that she has never smoked. She has never used smokeless tobacco. She reports current alcohol use. She reports that she does not use drugs.   Family History   Problem Relation Age of Onset    Hypertension Sister     Breast Cancer Sister 68 Allergies   Allergen Reactions    Amlodipine Rash     Review of Systems   CV:      chest pain, palpitations  PULM:  SOB, wheezing, cough, sputum production    Current Outpatient Meds and Allergies     Current Outpatient Medications on File Prior to Visit   Medication Sig Dispense Refill    losartan (COZAAR) 100 mg tablet TAKE ONE TABLET BY MOUTH ONE TIME DAILY 90 Tab 3    Guttaoqm-Shdx-Uumqlv-Hyalur Ac 323-895-80-2 mg cap Take  by mouth.  lysine (L-LYSINE) 500 mg tab tablet Take  by mouth three (3) times daily (with meals).  CHOLECALCIFEROL, VITAMIN D3, (VITAMIN D3 PO) Take  by mouth.  multivitamin (ONE A DAY) tablet Take 1 Tab by mouth daily.  [DISCONTINUED] hydroCHLOROthiazide (HYDRODIURIL) 25 mg tablet Take 1 Tab by mouth daily. 90 Tab 3     No current facility-administered medications on file prior to visit. Allergies   Allergen Reactions    Amlodipine Rash     Objective:       Visit Vitals  BP (!) 133/90   Pulse 84   Temp 98 °F (36.7 °C) (Temporal)   Resp 17   Ht 5' 6\" (1.676 m)   Wt 196 lb 9.6 oz (89.2 kg)   SpO2 97%   BMI 31.73 kg/m²     General:   Well-nourished, well-groomed, pleasant, alert, in no acute distress  Head:  Normocephalic, atraumatic  Ears:  External ears WNL  Nose:  External nares WNL  Psych:  No pressured speech, no abnormal thought content  Back:  Lump on thoracic spine, healed pustule on buttocks    Lab Results   Component Value Date/Time    Hemoglobin A1c 5.7 (H) 09/17/2020 10:09 AM    Hemoglobin A1c 5.9 (H) 03/11/2019 11:48 AM    Hemoglobin A1c 5.8 (H) 12/27/2017 02:32 PM    Glucose 101 (H) 09/17/2020 10:09 AM    LDL, calculated 117.2 (H) 09/17/2020 10:09 AM    Creatinine 1.30 09/17/2020 10:09 AM      Assessment/Plan & Orders:         ICD-10-CM ICD-9-CM    1. Medicare annual wellness visit, subsequent  Z00.00 V70.0    2. Benign essential HTN  I10 401.1    3. Prediabetes  R73.03 790.29    4. Stage 3a chronic kidney disease  N18.31 585.3    5.  Obesity, Class I, BMI 30-34.9  E66.9 278.00    6. Postmenopausal  Z78.0 V49.81 DEXA BONE DENSITY STUDY AXIAL   7. Encounter for screening mammogram for malignant neoplasm of breast  Z12.31 V76.12 CLARIBEL MAMMO BI SCREENING INCL CAD   8. Screening for depression  Z13.31 V79.0 [address] pt to come in to be examined if rash develops and back pain returns to get a diagnosis   Pt to monitor BP  Diet and exercise    Follow-up and Dispositions    · Return in about 4 weeks (around 11/11/2020) for Hypertension. *Patient verbalized understanding and agreement with the plan. Patient was given an after-visit summary. Urszula Setting.  [facility], MD - Internal Medicine  10/15/2020, 8:17 AM  [facility]  [address] [place], [address]  Phone [phone]  Fax [phone]

## 2020-10-14 NOTE — PROGRESS NOTES
Wilbert Forman is a 76 y.o. female and presents for annual Medicare Wellness Visit. Problem List: Reviewed with patient and discussed risk factors. Patient Active Problem List   Diagnosis Code    Benign essential HTN I10    Obesity, Class I, BMI 30-34.9 E66.9    Sciatica of left side M54.32    Post herpetic neuralgia B02.29    Memory loss R41.3    Pain of left hip joint M25.552    Osteopenia M85.80    Kidney disease, chronic, stage III (GFR 30-59 ml/min) N18.30    White coat syndrome with hypertension I10    Prediabetes R73.03     Current medical providers:  Patient Care Team:  Kandis Dalton MD as PCP - General (Internal Medicine)  Kandis Dalton MD [facility]  Joleen Sow MD (Surgery)    PSH: Reviewed with patient  Past Surgical History:   Procedure Laterality Date    APPENDECTOMY      CHOLECYSTOENTEROSTOMY      HX HYSTERECTOMY      HX OOPHORECTOMY      HX TONSIL AND ADENOIDECTOMY        SH: Reviewed with patient  Social History     Tobacco Use    Smoking status: Never Smoker    Smokeless tobacco: Never Used   Substance Use Topics    Alcohol use: Yes     Comment: socially    Drug use: No     FH: Reviewed with patient  Family History   Problem Relation Age of Onset    Hypertension Sister     Breast Cancer Sister 68     Medications/Allergies: Reviewed with patient  Current Outpatient Medications on File Prior to Visit   Medication Sig Dispense Refill    losartan (COZAAR) 100 mg tablet TAKE ONE TABLET BY MOUTH ONE TIME DAILY 90 Tab 3    Xcpxfath-Xgbn-Xvekhc-Hyalur Ac 812-298-34-2 mg cap Take  by mouth.  lysine (L-LYSINE) 500 mg tab tablet Take  by mouth three (3) times daily (with meals).  CHOLECALCIFEROL, VITAMIN D3, (VITAMIN D3 PO) Take  by mouth.  multivitamin (ONE A DAY) tablet Take 1 Tab by mouth daily.  hydroCHLOROthiazide (HYDRODIURIL) 25 mg tablet Take 1 Tab by mouth daily.  90 Tab 3     No current facility-administered medications on file prior to visit. Allergies   Allergen Reactions    Amlodipine Rash     Objective:  Visit Vitals  BP (!) 133/90   Pulse 84   Temp 98 °F (36.7 °C) (Temporal)   Resp 17   Ht 5' 6\" (1.676 m)   Wt 196 lb 9.6 oz (89.2 kg)   SpO2 97%   BMI 31.73 kg/m²    Body mass index is 31.73 kg/m². Assessment of cognitive impairment: Alert and oriented x 3  Depression Screen:   3 most recent PHQ Screens 10/14/2020   Little interest or pleasure in doing things Several days   Feeling down, depressed, irritable, or hopeless Several days   Total Score PHQ 2 2     Fall Risk Assessment:    Fall Risk Assessment, last 12 mths 4/30/2019   Able to walk? -   Fall in past 12 months? Yes   Fall with injury? Yes   Number of falls in past 12 months 5   Fall Risk Score 6     Functional Ability:   Does the patient exhibit a steady gait? yes   How long did it take the patient to get up and walk from a sitting position? 2 seconds   Is the patient self reliant?  (ie can do own laundry, meals, household chores)  yes   Does the patient handle his/her own medications? yes   Does the patient handle his/her own money? yes   Is the patients home safe (ie good lighting, handrails on stairs and bath, etc.)? yes   Did you notice or did patient express any hearing difficulties? no   Did you notice of did patient express any vision difficulties? Yes. Will get cataracts removed next year   Were distance and reading eye charts used? yes     Advance Care Planning:   Patient was offered the opportunity to discuss advance care planning:  yes     Does patient have an Advance Directive:  Yes. Pt would like to appoint her niece, Angel Hodge, as her medical decision maker in the event that she can no longer do so. [place] [phone]. Her secondary person is her brother, Mana Fuentes. Phone number is [phone]. If her death is imminent and medical treatment will not help her recover, pt does not want life prolonging measures.  If her condition makes her unaware of herself or her surroundings and she cannot interact with others, pt does not want life prolonging measures. If there is a chance for recovery, she would like to be kept alive for at least a week. Advance directive scanned in the chart under media. If no, did you provide information on [facility]?  no     Plan:    Orders Placed This Encounter    CLARIBEL MAMMO BI SCREENING INCL CAD    DEXA BONE DENSITY STUDY AXIAL    [place] DEPRESSION [address] Maintenance   Topic Date Due    Shingrix Vaccine Age 49> (1 of 2) 12/17/1995    Pneumococcal 65+ years (2 of 2 - PPSV23) 12/30/2016    Medicare Yearly Exam  03/11/2020    Breast Cancer Screen Mammogram  03/27/2020    FOBT Q1Y Age,18+  04/22/2020    GLAUCOMA SCREENING Q2Y  02/26/2021    A1C test (Diabetic or Prediabetic)  09/17/2021    Lipid Screen  09/17/2025    DTaP/Tdap/Td series (2 - Td) 12/30/2025    Hepatitis C Screening  Completed    Bone Densitometry (Dexa) Screening  Completed    Flu Vaccine  Completed     *Patient verbalized understanding and agreement with the plan. A copy of the After Visit Summary with personalized health plan was given to the patient today. Follow-up and Dispositions    · Return in about 4 weeks (around 11/11/2020) for Hypertension. Sherry Barrera.  Navneet Plummer MD - Internal Medicine  10/15/2020, 2:33 PM  [facility]  [address], [address]  Phone [phone]  Fax [phone]

## 2020-10-14 NOTE — PROGRESS NOTES
Urbano Grijalva is a 76 y.o.  female presents today for office visit for follow up. Pt would also like to discuss [address] Pt is  fasting. Pt is in Room # 2      1. Have you been to the ER, urgent care clinic since your last visit? Hospitalized since your last visit? No    2. Have you seen or consulted any other health care providers outside of the [address] [place] [address] since your last visit? Include any pap smears or colon screening. No    Upcoming Appts  none    Health Maintenance reviewed      VORB: No orders of the defined types were placed in this encounter.   Andrea Mullen LPN

## 2020-10-14 NOTE — PATIENT INSTRUCTIONS
Schedule of Personalized Health Plan (Provide Copy to Patient) The best way to stay healthy is to live a healthy lifestyle. A healthy lifestyle includes regular exercise, eating a well-balanced diet, keeping a healthy weight and not smoking. Regular physical exams and screening tests are another important way to take care of yourself. Preventive exams provided by health care providers can find health problems early when treatment works best and can keep you from getting certain diseases or illnesses. Preventive services include exams, lab tests, screenings, shots, monitoring and information to help you take care of your own health. All people over 65 should have a pneumonia shot. Pneumonia shots are usually only needed once in a lifetime unless your doctor decides differently. All people over 65 should have a yearly flu shot. People over 65 are at medium to high risk for Hepatitis B. Three shots are needed for complete protection. In addition to your physical exam, some screening tests are recommended: 
 
 
Screening for Breast Cancer is recommended yearly with a mammogram. 
 
Screening for Cervical Cancer is recommended every two years (annually for certain risk factors, such as previous history of STD or abnormal PAP in past 7 years), with a Pelvic Exam with PAP 
 
 Here is a list of your current Health Maintenance items with a due date: 
Health Maintenance Topic Date Due  Shingrix Vaccine Age 50> (1 of 2) 12/17/1995  Pneumococcal 65+ years (2 of 2 - PPSV23) 12/30/2016  Medicare Yearly Exam  03/11/2020  Breast Cancer Screen Mammogram  03/27/2020  FOBT Q1Y Age,18+  04/22/2020  GLAUCOMA SCREENING Q2Y  02/26/2021  A1C test (Diabetic or Prediabetic)  09/17/2021  Lipid Screen  09/17/2025  DTaP/Tdap/Td series (2 - Td) 12/30/2025  Hepatitis C Screening  Completed  Bone Densitometry (Dexa) Screening  Completed  Flu Vaccine  Completed

## 2020-10-15 PROBLEM — N18.4 CKD (CHRONIC KIDNEY DISEASE) STAGE 4, GFR 15-29 ML/MIN (HCC): Status: ACTIVE | Noted: 2020-10-15

## 2020-10-15 PROBLEM — N18.30 CKD (CHRONIC KIDNEY DISEASE) STAGE 3, GFR 30-59 ML/MIN (HCC): Status: ACTIVE | Noted: 2020-10-15

## 2020-11-05 ENCOUNTER — HOSPITAL ENCOUNTER (OUTPATIENT)
Dept: MAMMOGRAPHY | Age: 75
Discharge: HOME | End: 2020-11-05
Attending: INTERNAL MEDICINE
Payer: MEDICARE

## 2020-11-05 ENCOUNTER — HOSPITAL ENCOUNTER (OUTPATIENT)
Dept: GENERAL RADIOLOGY | Age: 75
Discharge: HOME | End: 2020-11-05
Attending: INTERNAL MEDICINE
Payer: MEDICARE

## 2020-11-05 DIAGNOSIS — Z12.31 VISIT FOR SCREENING MAMMOGRAM: ICD-10-CM

## 2020-11-05 DIAGNOSIS — Z12.31 ENCOUNTER FOR SCREENING MAMMOGRAM FOR MALIGNANT NEOPLASM OF BREAST: ICD-10-CM

## 2020-11-05 PROCEDURE — 77067 SCR MAMMO BI INCL CAD: CPT

## 2020-11-05 PROCEDURE — 77063 BREAST TOMOSYNTHESIS BI: CPT

## 2020-11-05 PROCEDURE — 77080 DXA BONE DENSITY AXIAL: CPT

## 2021-08-26 ENCOUNTER — OFFICE VISIT (OUTPATIENT)
Dept: FAMILY MEDICINE CLINIC | Age: 76
End: 2021-08-26
Payer: MEDICARE

## 2021-08-26 VITALS
SYSTOLIC BLOOD PRESSURE: 120 MMHG | WEIGHT: 200 LBS | OXYGEN SATURATION: 97 % | HEIGHT: 66 IN | HEART RATE: 77 BPM | RESPIRATION RATE: 16 BRPM | BODY MASS INDEX: 32.14 KG/M2 | DIASTOLIC BLOOD PRESSURE: 80 MMHG | TEMPERATURE: 97.8 F

## 2021-08-26 DIAGNOSIS — I10 BENIGN ESSENTIAL HTN: ICD-10-CM

## 2021-08-26 DIAGNOSIS — F41.1 GENERALIZED ANXIETY DISORDER: ICD-10-CM

## 2021-08-26 DIAGNOSIS — R73.03 PREDIABETES: ICD-10-CM

## 2021-08-26 DIAGNOSIS — N18.32 STAGE 3B CHRONIC KIDNEY DISEASE (HCC): Primary | ICD-10-CM

## 2021-08-26 DIAGNOSIS — I10 ESSENTIAL HYPERTENSION: ICD-10-CM

## 2021-08-26 PROCEDURE — 1101F PT FALLS ASSESS-DOCD LE1/YR: CPT | Performed by: INTERNAL MEDICINE

## 2021-08-26 PROCEDURE — G8399 PT W/DXA RESULTS DOCUMENT: HCPCS | Performed by: INTERNAL MEDICINE

## 2021-08-26 PROCEDURE — G8510 SCR DEP NEG, NO PLAN REQD: HCPCS | Performed by: INTERNAL MEDICINE

## 2021-08-26 PROCEDURE — G8417 CALC BMI ABV UP PARAM F/U: HCPCS | Performed by: INTERNAL MEDICINE

## 2021-08-26 PROCEDURE — G8536 NO DOC ELDER MAL SCRN: HCPCS | Performed by: INTERNAL MEDICINE

## 2021-08-26 PROCEDURE — G8427 DOCREV CUR MEDS BY ELIG CLIN: HCPCS | Performed by: INTERNAL MEDICINE

## 2021-08-26 PROCEDURE — G8754 DIAS BP LESS 90: HCPCS | Performed by: INTERNAL MEDICINE

## 2021-08-26 PROCEDURE — 3017F COLORECTAL CA SCREEN DOC REV: CPT | Performed by: INTERNAL MEDICINE

## 2021-08-26 PROCEDURE — G8752 SYS BP LESS 140: HCPCS | Performed by: INTERNAL MEDICINE

## 2021-08-26 PROCEDURE — 99214 OFFICE O/P EST MOD 30 MIN: CPT | Performed by: INTERNAL MEDICINE

## 2021-08-26 PROCEDURE — 1090F PRES/ABSN URINE INCON ASSESS: CPT | Performed by: INTERNAL MEDICINE

## 2021-08-26 RX ORDER — LOSARTAN POTASSIUM 100 MG/1
TABLET ORAL
Qty: 90 TABLET | Refills: 3 | Status: SHIPPED | OUTPATIENT
Start: 2021-08-26 | End: 2022-02-09 | Stop reason: SDUPTHER

## 2021-08-26 RX ORDER — ACETAMINOPHEN 160 MG/5ML
200 SUSPENSION, ORAL (FINAL DOSE FORM) ORAL DAILY
COMMUNITY

## 2021-08-26 NOTE — PROGRESS NOTES
History of Present Illness  Yfn Sommers is a 76 y.o. female who presents today for management of    Chief Complaint   Patient presents with    Establish Care    Hypertension    Dizziness       Patient is here to establish care. Previous PCP: Dr. Harvey Bernabe  Patient complains of evaluation of anxiety. She has the following anxiety symptoms: feeling nervous, has avoided going out, mood swings. Onset of symptoms was approximately 2 years ago, stable since that time. She denies current suicidal and homicidal ideation. Possible organic causes contributing are: none. Risk factors: retired 3 years ago. Patient has a remote history of depression. Her marriage ended in divorce. Previous treatment includes Zoloft, Prozac, duloxetine and individual therapy. Hypertension  Age at onset of elevated blood pressure:  20.  She indicates that she is feeling well and denies any symptoms referable to her elevated blood pressure. Specifically denies chest pain, palpitations, dyspnea, orthopnea, PND or peripheral edema. Current medication regimen is as listed  below. Patient has these side effects of medication: none. Cardiovascular risk factors: post-menopausal Use of agents associated with hypertension: none History of renal disease: yes - CKD stage 3b.  History of flank trauma: negative      Past Medical History  Past Medical History:   Diagnosis Date    Acute midline low back pain with left-sided sciatica 10/5/2016    Benign essential HTN 12/30/2015    CKD (chronic kidney disease), stage III ([facility]) 12/30/2015    Elevated hemoglobin A1c 11/9/2016    Kidney disease, chronic, stage III (GFR 30-59 ml/min) ([facility] [address] 11/9/2016    Memory loss 1/13/2016    Menopause     Obesity, Class I, BMI 30-34.9 12/30/2015    Osteopenia 10/19/2016    Pain of left hip joint 10/5/2016    Post herpetic neuralgia 12/30/2015    Prediabetes 8/23/2017    Sciatica of left side 12/30/2015    White coat syndrome with hypertension 8/23/2017        Surgical History  Past Surgical History:   Procedure Laterality Date    HX HYSTERECTOMY      precancerous polyps, menorrhagia    HX OOPHORECTOMY      HX TONSIL AND ADENOIDECTOMY      OR APPENDECTOMY      OR CHOLECYSTOENTEROSTOMY          Current Medications  Current Outpatient Medications   Medication Sig    coenzyme Q-10 (Co Q-10) 200 mg capsule Take 200 mg by mouth daily.  losartan (COZAAR) 100 mg tablet TAKE ONE TABLET BY MOUTH ONE TIME DAILY    Djjlmouw-Yxac-Ohsfuc-Hyalur Ac 535-422-79-2 mg cap Take  by mouth.  lysine (L-LYSINE) 500 mg tab tablet Take  by mouth three (3) times daily (with meals).  CHOLECALCIFEROL, VITAMIN D3, (VITAMIN D3 PO) Take  by mouth.  multivitamin (ONE A DAY) tablet Take 1 Tab by mouth daily. No current facility-administered medications for this visit.        Allergies/Drug Reactions  Allergies   Allergen Reactions    Amlodipine Rash        Family History  Family History   Problem Relation Age of Onset    Hypertension Sister     Breast Cancer Sister 68        Social History  Social History     Tobacco Use    Smoking status: Never Smoker    Smokeless tobacco: Never Used   Substance Use Topics    Alcohol use: Yes     Comment: socially    Drug use: No        Health Maintenance   Topic Date Due    Shingrix Vaccine Age 50> (1 of 2) Never done    Pneumococcal 65+ yrs at Risk Vaccine (2 of 2 - PPSV23) 12/30/2016    Flu Vaccine (1) 09/01/2021    A1C test (Diabetic or Prediabetic)  09/17/2021    Colorectal Cancer Screening Combo  10/03/2021    Medicare Yearly Exam  10/15/2021    Lipid Screen  09/17/2025    DTaP/Tdap/Td series (2 - Td or Tdap) 12/30/2025    Hepatitis C Screening  Completed    Bone Densitometry (Dexa) Screening  Completed    COVID-19 Vaccine  Completed     Immunization History   Administered Date(s) Administered    Covid-19, MODERNA, Mrna, Lnp-s, Pf, 100mcg/0.5mL 02/19/2021, 03/19/2021    Influenza High Dose Vaccine PF 08/23/2017    Influenza Vaccine (Quad) PF (>6 Mo Flulaval, Fluarix, and >3 Arlina Conowingo, Fluzone 16432) 10/05/2016    Influenza Vaccine (Tri) Adjuvanted (>65 Yrs FLUAD TRI 89589) 03/11/2019    Influenza, Quadrivalent, Adjuvanted (>65 Yrs FLUAD QUAD 98492) 09/17/2020    Pneumococcal Conjugate (PCV-13) 12/30/2015    Tdap 12/30/2015       Review of Systems  Review of Systems   Constitutional: Negative. Respiratory: Negative. Cardiovascular: Negative. Gastrointestinal: Negative. Genitourinary: Negative. Musculoskeletal: Negative. Neurological: Negative. Psychiatric/Behavioral: Negative for depression, hallucinations, memory loss and substance abuse. The patient is nervous/anxious. The patient does not have insomnia.            Physical Exam  Vital signs:   Vitals:    08/26/21 0829   BP: 120/80   Pulse: 77   Resp: 16   Temp: 97.8 °F (36.6 °C)   TempSrc: Temporal   SpO2: 97%   Weight: 200 lb (90.7 kg)   Height: 5' 6\" (1.676 m)       General: alert, oriented, not in distress  Head: scalp normal, atraumatic  Eyes: pupils are equal and reactive, full and intact EOM's  Lips/Mouth: moist lips and buccal mucosa, non-enlarged tonsils, pink throat  Neck: supple, no JVD, no lymphadenopathy, non-palpable thyroid  Chest/Lungs: clear breath sounds, no wheezing or crackles  Heart: normal rate, regular rhythm, no murmur  Abdomen: soft, non-distended, non-tender, normal bowel sounds, no organomegaly, no masses  Extremities: no focal deformities, no edema  Skin: no active skin lesions      Laboratory/Tests:  Lab Results   Component Value Date/Time    Cholesterol, total 191 09/17/2020 10:09 AM    HDL Cholesterol 58 09/17/2020 10:09 AM    LDL, calculated 117.2 (H) 09/17/2020 10:09 AM    Triglyceride 79 09/17/2020 10:09 AM    CHOL/HDL Ratio 3.3 09/17/2020 10:09 AM     Lab Results   Component Value Date/Time    GFR est non-AA 40 (L) 09/17/2020 10:09 AM    GFR est AA 49 (L) 09/17/2020 10:09 AM    Creatinine 1.30 09/17/2020 10:09 AM    BUN 18 09/17/2020 10:09 AM    Sodium 141 09/17/2020 10:09 AM    Potassium 4.7 09/17/2020 10:09 AM    Chloride 111 09/17/2020 10:09 AM    CO2 26 09/17/2020 10:09 AM        Assessment/Plan:    1. Stage 3b chronic kidney disease ([facility] [address]  - Blood pressure control  - METABOLIC PANEL, COMPREHENSIVE; Future  - VITAMIN D, 25 HYDROXY; Future  - URINALYSIS W/ RFLX MICROSCOPIC; Future    2. Essential hypertension  - controlled  - continue losartan  - CBC WITH AUTOMATED DIFF; Future  - METABOLIC PANEL, COMPREHENSIVE; Future  - TSH 3RD GENERATION; Future  - LIPID PANEL; Future    3. Prediabetes  - HEMOGLOBIN A1C WITH EAG; Future    4. Generalized anxiety disorder  - Counseled on:  · Adequate sleep  · Eating healthy foods. Try to avoid eating only foods that give comfort. Ask someone to join you for a meal if you do not like eating alone. Consider taking a multivitamin every day. · Get some exercise every day. Even a walk can help. Other exercises, such as yoga, can also help manage stress. · Comfort yourself. Take time to look at photos or use special items that make you feel better. · Stay involved in your life. Do not withdraw from the activities you enjoy. People you know at work, [employer], clubs, or other groups can help you get through your period of anxiety and depression. Follow-up and Dispositions    · Return in about 3 months (around 11/26/2021) for ROV, medicare wellness visit. I have discussed the diagnosis with the patient and the intended plan as seen in the above orders. The patient has received an after-visit summary and questions were answered concerning future plans. I have discussed medication side effects and warnings with the patient as well. I have reviewed the plan of care with the patient, accepted their input and they are in agreement with the treatment goals.        Arpan Aponte MD  August 26, 2021

## 2021-11-09 ENCOUNTER — TRANSCRIBE ORDER (OUTPATIENT)
Dept: SCHEDULING | Age: 76
End: 2021-11-09

## 2021-11-09 DIAGNOSIS — Z12.31 SCREENING MAMMOGRAM FOR HIGH-RISK PATIENT: Primary | ICD-10-CM

## 2021-11-09 DIAGNOSIS — Z12.31 VISIT FOR SCREENING MAMMOGRAM: ICD-10-CM

## 2021-11-22 ENCOUNTER — HOSPITAL ENCOUNTER (OUTPATIENT)
Dept: LAB | Age: 76
Discharge: HOME | End: 2021-11-22
Payer: MEDICARE

## 2021-11-22 ENCOUNTER — CLINICAL SUPPORT (OUTPATIENT)
Dept: FAMILY MEDICINE CLINIC | Age: 76
End: 2021-11-22
Payer: MEDICARE

## 2021-11-22 DIAGNOSIS — I10 ESSENTIAL HYPERTENSION: ICD-10-CM

## 2021-11-22 DIAGNOSIS — Z23 ENCOUNTER FOR IMMUNIZATION: Primary | ICD-10-CM

## 2021-11-22 DIAGNOSIS — N18.32 STAGE 3B CHRONIC KIDNEY DISEASE (HCC): ICD-10-CM

## 2021-11-22 DIAGNOSIS — R73.03 PREDIABETES: ICD-10-CM

## 2021-11-22 LAB
25(OH)D3 SERPL-MCNC: 53.5 NG/ML (ref 30–100)
ALBUMIN SERPL-MCNC: 3.9 G/DL (ref 3.4–5)
ALBUMIN/GLOB SERPL: 1 {RATIO} (ref 0.8–1.7)
ALP SERPL-CCNC: 88 U/L (ref 45–117)
ALT SERPL-CCNC: 24 U/L (ref 13–56)
ANION GAP SERPL CALC-SCNC: 5 MMOL/L (ref 3–18)
APPEARANCE UR: CLEAR
AST SERPL-CCNC: 24 U/L (ref 10–38)
BACTERIA URNS QL MICRO: ABNORMAL /HPF
BASOPHILS # BLD: 0.1 K/UL (ref 0–0.1)
BASOPHILS NFR BLD: 1 % (ref 0–2)
BILIRUB SERPL-MCNC: 0.6 MG/DL (ref 0.2–1)
BILIRUB UR QL: NEGATIVE
BUN SERPL-MCNC: 16 MG/DL (ref 7–18)
BUN/CREAT SERPL: 13 (ref 12–20)
CALCIUM SERPL-MCNC: 9.8 MG/DL (ref 8.5–10.1)
CHLORIDE SERPL-SCNC: 105 MMOL/L (ref 100–111)
CHOLEST SERPL-MCNC: 206 MG/DL
CO2 SERPL-SCNC: 30 MMOL/L (ref 21–32)
COLOR UR: YELLOW
CREAT SERPL-MCNC: 1.25 MG/DL (ref 0.6–1.3)
DIFFERENTIAL METHOD BLD: ABNORMAL
EOSINOPHIL # BLD: 0.2 K/UL (ref 0–0.4)
EOSINOPHIL NFR BLD: 2 % (ref 0–5)
EPITH CASTS URNS QL MICRO: ABNORMAL /LPF (ref 0–5)
ERYTHROCYTE [DISTWIDTH] IN BLOOD BY AUTOMATED COUNT: 13.7 % (ref 11.6–14.5)
EST. AVERAGE GLUCOSE BLD GHB EST-MCNC: 117 MG/DL
GLOBULIN SER CALC-MCNC: 4 G/DL (ref 2–4)
GLUCOSE SERPL-MCNC: 100 MG/DL (ref 74–99)
GLUCOSE UR STRIP.AUTO-MCNC: NEGATIVE MG/DL
HBA1C MFR BLD: 5.7 % (ref 4.2–5.6)
HCT VFR BLD AUTO: 46.7 % (ref 35–45)
HDLC SERPL-MCNC: 49 MG/DL (ref 40–60)
HDLC SERPL: 4.2 {RATIO} (ref 0–5)
HGB BLD-MCNC: 14.9 G/DL (ref 12–16)
HGB UR QL STRIP: NEGATIVE
IMM GRANULOCYTES # BLD AUTO: 0 K/UL (ref 0–0.04)
IMM GRANULOCYTES NFR BLD AUTO: 0 % (ref 0–0.5)
KETONES UR QL STRIP.AUTO: ABNORMAL MG/DL
LDLC SERPL CALC-MCNC: 134.8 MG/DL (ref 0–100)
LEUKOCYTE ESTERASE UR QL STRIP.AUTO: ABNORMAL
LIPID PROFILE,FLP: ABNORMAL
LYMPHOCYTES # BLD: 2.9 K/UL (ref 0.9–3.6)
LYMPHOCYTES NFR BLD: 33 % (ref 21–52)
MCH RBC QN AUTO: 29 PG (ref 24–34)
MCHC RBC AUTO-ENTMCNC: 31.9 G/DL (ref 31–37)
MCV RBC AUTO: 90.9 FL (ref 78–100)
MONOCYTES # BLD: 0.8 K/UL (ref 0.05–1.2)
MONOCYTES NFR BLD: 9 % (ref 3–10)
MUCOUS THREADS URNS QL MICRO: ABNORMAL /LPF
NEUTS SEG # BLD: 4.8 K/UL (ref 1.8–8)
NEUTS SEG NFR BLD: 55 % (ref 40–73)
NITRITE UR QL STRIP.AUTO: NEGATIVE
NRBC # BLD: 0 K/UL (ref 0–0.01)
NRBC BLD-RTO: 0 PER 100 WBC
PH UR STRIP: 5 [PH] (ref 5–8)
PLATELET # BLD AUTO: 237 K/UL (ref 135–420)
PMV BLD AUTO: 12.6 FL (ref 9.2–11.8)
POTASSIUM SERPL-SCNC: 3.9 MMOL/L (ref 3.5–5.5)
PROT SERPL-MCNC: 7.9 G/DL (ref 6.4–8.2)
PROT UR STRIP-MCNC: ABNORMAL MG/DL
RBC # BLD AUTO: 5.14 M/UL (ref 4.2–5.3)
SODIUM SERPL-SCNC: 140 MMOL/L (ref 136–145)
SP GR UR REFRACTOMETRY: 1.03 (ref 1–1.03)
TRIGL SERPL-MCNC: 111 MG/DL (ref ?–150)
TSH SERPL DL<=0.05 MIU/L-ACNC: 1.77 UIU/ML (ref 0.36–3.74)
UROBILINOGEN UR QL STRIP.AUTO: 0.2 EU/DL (ref 0.2–1)
VLDLC SERPL CALC-MCNC: 22.2 MG/DL
WBC # BLD AUTO: 8.7 K/UL (ref 4.6–13.2)
WBC URNS QL MICRO: ABNORMAL /HPF (ref 0–5)

## 2021-11-22 PROCEDURE — G0008 ADMIN INFLUENZA VIRUS VAC: HCPCS | Performed by: STUDENT IN AN ORGANIZED HEALTH CARE EDUCATION/TRAINING PROGRAM

## 2021-11-22 PROCEDURE — 83036 HEMOGLOBIN GLYCOSYLATED A1C: CPT

## 2021-11-22 PROCEDURE — 81001 URINALYSIS AUTO W/SCOPE: CPT

## 2021-11-22 PROCEDURE — 84443 ASSAY THYROID STIM HORMONE: CPT

## 2021-11-22 PROCEDURE — 82306 VITAMIN D 25 HYDROXY: CPT

## 2021-11-22 PROCEDURE — 80061 LIPID PANEL: CPT

## 2021-11-22 PROCEDURE — 36415 COLL VENOUS BLD VENIPUNCTURE: CPT

## 2021-11-22 PROCEDURE — 90694 VACC AIIV4 NO PRSRV 0.5ML IM: CPT | Performed by: STUDENT IN AN ORGANIZED HEALTH CARE EDUCATION/TRAINING PROGRAM

## 2021-11-22 PROCEDURE — 85025 COMPLETE CBC W/AUTO DIFF WBC: CPT

## 2021-11-22 PROCEDURE — 80053 COMPREHEN METABOLIC PANEL: CPT

## 2021-11-22 NOTE — PROGRESS NOTES
After obtaining consent, and per orders of Dr. Chapo Vang, injection of flu vaccine given by Jessee Islas. Patient instructed to remain in clinic for 10 minutes afterwards, and to report any adverse reaction to me immediately.

## 2021-11-29 ENCOUNTER — OFFICE VISIT (OUTPATIENT)
Dept: FAMILY MEDICINE CLINIC | Age: 76
End: 2021-11-29
Payer: MEDICARE

## 2021-11-29 VITALS
BODY MASS INDEX: 31.79 KG/M2 | OXYGEN SATURATION: 98 % | HEIGHT: 66 IN | TEMPERATURE: 97.2 F | SYSTOLIC BLOOD PRESSURE: 149 MMHG | RESPIRATION RATE: 16 BRPM | WEIGHT: 197.8 LBS | DIASTOLIC BLOOD PRESSURE: 90 MMHG | HEART RATE: 80 BPM

## 2021-11-29 DIAGNOSIS — Z12.11 SCREENING FOR MALIGNANT NEOPLASM OF COLON: ICD-10-CM

## 2021-11-29 DIAGNOSIS — F41.1 GENERALIZED ANXIETY DISORDER: ICD-10-CM

## 2021-11-29 DIAGNOSIS — E78.5 DIET-CONTROLLED HYPERLIPIDEMIA: ICD-10-CM

## 2021-11-29 DIAGNOSIS — N18.32 STAGE 3B CHRONIC KIDNEY DISEASE (HCC): ICD-10-CM

## 2021-11-29 DIAGNOSIS — L82.1 SEBORRHEIC KERATOSIS: ICD-10-CM

## 2021-11-29 DIAGNOSIS — R73.03 PREDIABETES: ICD-10-CM

## 2021-11-29 DIAGNOSIS — I10 ESSENTIAL HYPERTENSION: Primary | ICD-10-CM

## 2021-11-29 DIAGNOSIS — Z00.00 MEDICARE ANNUAL WELLNESS VISIT, SUBSEQUENT: ICD-10-CM

## 2021-11-29 PROCEDURE — G8536 NO DOC ELDER MAL SCRN: HCPCS | Performed by: INTERNAL MEDICINE

## 2021-11-29 PROCEDURE — 3017F COLORECTAL CA SCREEN DOC REV: CPT | Performed by: INTERNAL MEDICINE

## 2021-11-29 PROCEDURE — G8399 PT W/DXA RESULTS DOCUMENT: HCPCS | Performed by: INTERNAL MEDICINE

## 2021-11-29 PROCEDURE — 99214 OFFICE O/P EST MOD 30 MIN: CPT | Performed by: INTERNAL MEDICINE

## 2021-11-29 PROCEDURE — G0439 PPPS, SUBSEQ VISIT: HCPCS | Performed by: INTERNAL MEDICINE

## 2021-11-29 PROCEDURE — G8417 CALC BMI ABV UP PARAM F/U: HCPCS | Performed by: INTERNAL MEDICINE

## 2021-11-29 PROCEDURE — G8755 DIAS BP > OR = 90: HCPCS | Performed by: INTERNAL MEDICINE

## 2021-11-29 PROCEDURE — G8753 SYS BP > OR = 140: HCPCS | Performed by: INTERNAL MEDICINE

## 2021-11-29 PROCEDURE — 1101F PT FALLS ASSESS-DOCD LE1/YR: CPT | Performed by: INTERNAL MEDICINE

## 2021-11-29 PROCEDURE — 1090F PRES/ABSN URINE INCON ASSESS: CPT | Performed by: INTERNAL MEDICINE

## 2021-11-29 PROCEDURE — G8510 SCR DEP NEG, NO PLAN REQD: HCPCS | Performed by: INTERNAL MEDICINE

## 2021-11-29 PROCEDURE — G8427 DOCREV CUR MEDS BY ELIG CLIN: HCPCS | Performed by: INTERNAL MEDICINE

## 2021-11-29 RX ORDER — GLUCOSAMINE/CHONDR SU A SOD 750-600 MG
TABLET ORAL DAILY
COMMUNITY

## 2021-11-29 RX ORDER — METOPROLOL SUCCINATE 25 MG/1
25 TABLET, EXTENDED RELEASE ORAL
Qty: 90 TABLET | Refills: 0 | Status: SHIPPED | OUTPATIENT
Start: 2021-11-29

## 2021-11-29 NOTE — PATIENT INSTRUCTIONS

## 2021-11-29 NOTE — PROGRESS NOTES
History of Present Illness  Etta Manzanares is a 76 y.o. female who presents today for management of    Chief Complaint   Patient presents with    Annual Wellness Visit    Anxiety     while driving     Hypertension     120/73 checked at home        Anxiety Review:  Patient is seen for anxiety disorder. Current treatment includes none. Ongoing symptoms include: racing thoughts, psychomotor agitation. Patient denies: chest pain, shortness of breath. Cardiovascular Review:  The patient has prediabetes and hypertension and CKD stage 3. Diet and Lifestyle: generally follows a low fat low cholesterol diet, generally follows a low sodium diet, nonsmoker  Home BP Monitoring: is well controlled at home, ranging 120's/70's. Pertinent ROS: taking medications as instructed, no medication side effects noted, no TIA's, no chest pain on exertion, no dyspnea on exertion, no swelling of ankles. Problem List  Patient Active Problem List    Diagnosis Date Noted    Diet-controlled hyperlipidemia 11/29/2021    Generalized anxiety disorder 08/26/2021    CKD (chronic kidney disease) stage 3, GFR 30-59 ml/min ([facility]) 10/15/2020    Essential hypertension 08/23/2017    Prediabetes 08/23/2017    Osteopenia 10/19/2016    Pain of left hip joint 10/05/2016    Obesity, Class I, BMI 30-34.9 12/30/2015    Sciatica of left side 12/30/2015       Current Medications  Current Outpatient Medications   Medication Sig    Biotin 2,500 mcg cap Take  by mouth daily.  metoprolol succinate (TOPROL-XL) 25 mg XL tablet Take 1 Tablet by mouth nightly.  coenzyme Q-10 (Co Q-10) 200 mg capsule Take 200 mg by mouth daily.  losartan (COZAAR) 100 mg tablet TAKE ONE TABLET BY MOUTH ONE TIME DAILY    lysine (L-LYSINE) 500 mg tab tablet Take 500 mg by mouth daily.  CHOLECALCIFEROL, VITAMIN D3, (VITAMIN D3 PO) Take  by mouth.  multivitamin (ONE A DAY) tablet Take 1 Tab by mouth daily.      No current facility-administered medications for this visit. Allergies/Drug Reactions  Allergies   Allergen Reactions    Amlodipine Rash        Review of Systems  Review of Systems   Constitutional: Negative. Respiratory: Negative. Cardiovascular: Negative. Gastrointestinal: Negative. Neurological: Negative for dizziness and headaches. Psychiatric/Behavioral: Negative for depression, substance abuse and suicidal ideas. Physical Exam  Vital signs:   Vitals:    11/29/21 1025   BP: (!) 149/90   Pulse: 80   Resp: 16   Temp: 97.2 °F (36.2 °C)   TempSrc: Temporal   SpO2: 98%   Weight: 197 lb 12.8 oz (89.7 kg)   Height: 5' 6\" (1.676 m)       General: alert, oriented, not in distress  Head: scalp normal, atraumatic  Eyes: pupils are equal and reactive, full and intact EOM's  Neck: supple, no JVD, no lymphadenopathy, non-palpable thyroid  Chest/Lungs: clear breath sounds, no wheezing or crackles  Heart: normal rate, regular rhythm, no murmur  Extremities: no focal deformities, no edema  Skin: seborrheic keratosis on the left temple      Laboratory/Tests:  Lab Results   Component Value Date/Time    Cholesterol, total 206 (H) 11/22/2021 03:14 PM    HDL Cholesterol 49 11/22/2021 03:14 PM    LDL, calculated 134.8 (H) 11/22/2021 03:14 PM    Triglyceride 111 11/22/2021 03:14 PM    CHOL/HDL Ratio 4.2 11/22/2021 03:14 PM     Lab Results   Component Value Date/Time    GFR est non-AA 42 (L) 11/22/2021 03:14 PM    GFR est AA 51 (L) 11/22/2021 03:14 PM    Creatinine 1.25 11/22/2021 03:14 PM    BUN 16 11/22/2021 03:14 PM    Sodium 140 11/22/2021 03:14 PM    Potassium 3.9 11/22/2021 03:14 PM    Chloride 105 11/22/2021 03:14 PM    CO2 30 11/22/2021 03:14 PM     Lab Results   Component Value Date/Time    Hemoglobin A1c 5.7 (H) 11/22/2021 03:14 PM         Assessment/Plan:    1. Essential hypertension  - needs better control  - add metoprolol succinate (TOPROL-XL) 25 mg XL tablet; Take 1 Tablet by mouth nightly. Dispense: 90 Tablet;  Refill: 0  - continue losartan    2. Stage 3b chronic kidney disease (HCC)  - stable  - blood pressure control  - avoid nephrotoxins    3. Prediabetes  - stable    4. Diet-controlled hyperlipidemia  - low fat diet    5. Generalized anxiety disorder  - stable without meds    6. Seborrheic keratosis  - Patient reassured. Monitor skin lesions. Follow-up and Dispositions    · Return in about 3 months (around 2/28/2022) for establish care with a new PCP. I have discussed the diagnosis with the patient and the intended plan as seen in the above orders. The patient has received an after-visit summary and questions were answered concerning future plans. I have discussed medication side effects and warnings with the patient as well. I have reviewed the plan of care with the patient, accepted their input and they are in agreement with the treatment goals. Juan Bailey MD  November 29, 2021       This is the Subsequent Medicare Annual Wellness Exam, performed 12 months or more after the Initial AWV or the last Subsequent AWV    I have reviewed the patient's medical history in detail and updated the computerized patient record. Assessment/Plan   Education and counseling provided:  Are appropriate based on today's review and evaluation  Pneumococcal Vaccine  Screening Mammography  Diabetes outpatient self-management training services    1. Essential hypertension  -     metoprolol succinate (TOPROL-XL) 25 mg XL tablet; Take 1 Tablet by mouth nightly., Normal, Disp-90 Tablet, R-0  2. Stage 3b chronic kidney disease ([address]  3. Prediabetes  4. Diet-controlled hyperlipidemia  5. Generalized anxiety disorder  6. Seborrheic keratosis  7. Medicare annual wellness visit, subsequent  8.  Screening for malignant neoplasm of colon  -     OCCULT BLOOD IMMUNOASSAY,DIAGNOSTIC; Future  -     REFERRAL TO GASTROENTEROLOGY       Depression Risk Factor Screening     3 most recent PHQ Screens 11/29/2021   Little interest or pleasure in doing things Not at all   Feeling down, depressed, irritable, or hopeless Not at all   Total Score PHQ 2 0       Alcohol Risk Screen    Do you average more than 1 drink per night or more than 7 drinks a week:  No    On any one occasion in the past three months have you have had more than 3 drinks containing alcohol:  No        Functional Ability and Level of Safety    Hearing: Hearing is good. Activities of Daily Living: The home contains: no safety equipment. Patient does total self care      Ambulation: with no difficulty     Fall Risk:  Fall Risk Assessment, last 12 mths 11/29/2021   Able to walk? Yes   Fall in past 12 months? 0   Do you feel unsteady? 0   Are you worried about falling 0   Is TUG test greater than 12 seconds? -   Is the gait abnormal? -   Number of falls in past 12 months -   Fall with injury?  -      Abuse Screen:  Patient is not abused       Cognitive Screening    Has your family/caregiver stated any concerns about your memory: no     Cognitive Screening: Normal - Verbal Fluency Test    Health Maintenance Due     Health Maintenance Due   Topic Date Due    Shingrix Vaccine Age 49> (1 of 2) Never done    Pneumococcal 65+ years (2 of 2 - PPSV23) 12/30/2016    Colorectal Cancer Screening Combo  10/03/2021       Patient Care Team   Patient Care Team:  Alice Singh MD as PCP - General (Internal Medicine)  Alice Singh MD as PCP [facility] Empaneled Provider    History     Patient Active Problem List   Diagnosis Code    Obesity, Class I, BMI 30-34.9 E66.9    Sciatica of left side M54.32    Pain of left hip joint M25.552    Osteopenia M85.80    Essential hypertension I10    Prediabetes R73.03    CKD (chronic kidney disease) stage 3, GFR 30-59 ml/min ([address] N18.30    Generalized anxiety disorder F41.1    Diet-controlled hyperlipidemia E78.5     Past Medical History:   Diagnosis Date    Acute midline low back pain with left-sided sciatica 10/5/2016    Benign essential HTN 12/30/2015    CKD (chronic kidney disease), stage III ([facility]) 12/30/2015    Elevated hemoglobin A1c 11/9/2016    Kidney disease, chronic, stage III (GFR 30-59 ml/min) ([facility]) 11/9/2016    Memory loss 1/13/2016    Menopause     Obesity, Class I, BMI 30-34.9 12/30/2015    Osteopenia 10/19/2016    Pain of left hip joint 10/5/2016    Post herpetic neuralgia 12/30/2015    Prediabetes 8/23/2017    Sciatica of left side 12/30/2015    White coat syndrome with hypertension 8/23/2017      Past Surgical History:   Procedure Laterality Date    HX HYSTERECTOMY      precancerous polyps, menorrhagia    HX OOPHORECTOMY      HX TONSIL AND ADENOIDECTOMY      VT APPENDECTOMY      VT CHOLECYSTOENTEROSTOMY       Current Outpatient Medications   Medication Sig Dispense Refill    Biotin 2,500 mcg cap Take  by mouth daily.  metoprolol succinate (TOPROL-XL) 25 mg XL tablet Take 1 Tablet by mouth nightly. 90 Tablet 0    coenzyme Q-10 (Co Q-10) 200 mg capsule Take 200 mg by mouth daily.  losartan (COZAAR) 100 mg tablet TAKE ONE TABLET BY MOUTH ONE TIME DAILY 90 Tablet 3    lysine (L-LYSINE) 500 mg tab tablet Take 500 mg by mouth daily.  CHOLECALCIFEROL, VITAMIN D3, (VITAMIN D3 PO) Take  by mouth.  multivitamin (ONE A DAY) tablet Take 1 Tab by mouth daily.        Allergies   Allergen Reactions    Amlodipine Rash       Family History   Problem Relation Age of Onset    Hypertension Sister     Breast Cancer Sister 68     Social History     Tobacco Use    Smoking status: Never Smoker    Smokeless tobacco: Never Used   Substance Use Topics    Alcohol use: Yes     Comment: betsy Rich MD

## 2022-02-09 ENCOUNTER — OFFICE VISIT (OUTPATIENT)
Dept: FAMILY MEDICINE CLINIC | Age: 77
End: 2022-02-09
Payer: MEDICARE

## 2022-02-09 VITALS
OXYGEN SATURATION: 97 % | TEMPERATURE: 97.9 F | BODY MASS INDEX: 31.63 KG/M2 | HEIGHT: 66 IN | DIASTOLIC BLOOD PRESSURE: 85 MMHG | SYSTOLIC BLOOD PRESSURE: 136 MMHG | HEART RATE: 82 BPM | RESPIRATION RATE: 16 BRPM | WEIGHT: 196.8 LBS

## 2022-02-09 DIAGNOSIS — I10 BENIGN ESSENTIAL HTN: ICD-10-CM

## 2022-02-09 DIAGNOSIS — N18.32 STAGE 3B CHRONIC KIDNEY DISEASE (HCC): ICD-10-CM

## 2022-02-09 DIAGNOSIS — R00.2 PALPITATIONS: Primary | ICD-10-CM

## 2022-02-09 DIAGNOSIS — F41.9 ANXIETY: ICD-10-CM

## 2022-02-09 PROCEDURE — G8428 CUR MEDS NOT DOCUMENT: HCPCS | Performed by: STUDENT IN AN ORGANIZED HEALTH CARE EDUCATION/TRAINING PROGRAM

## 2022-02-09 PROCEDURE — G8536 NO DOC ELDER MAL SCRN: HCPCS | Performed by: STUDENT IN AN ORGANIZED HEALTH CARE EDUCATION/TRAINING PROGRAM

## 2022-02-09 PROCEDURE — 1101F PT FALLS ASSESS-DOCD LE1/YR: CPT | Performed by: STUDENT IN AN ORGANIZED HEALTH CARE EDUCATION/TRAINING PROGRAM

## 2022-02-09 PROCEDURE — 1090F PRES/ABSN URINE INCON ASSESS: CPT | Performed by: STUDENT IN AN ORGANIZED HEALTH CARE EDUCATION/TRAINING PROGRAM

## 2022-02-09 PROCEDURE — 99214 OFFICE O/P EST MOD 30 MIN: CPT | Performed by: STUDENT IN AN ORGANIZED HEALTH CARE EDUCATION/TRAINING PROGRAM

## 2022-02-09 PROCEDURE — G8752 SYS BP LESS 140: HCPCS | Performed by: STUDENT IN AN ORGANIZED HEALTH CARE EDUCATION/TRAINING PROGRAM

## 2022-02-09 PROCEDURE — G8417 CALC BMI ABV UP PARAM F/U: HCPCS | Performed by: STUDENT IN AN ORGANIZED HEALTH CARE EDUCATION/TRAINING PROGRAM

## 2022-02-09 PROCEDURE — G8399 PT W/DXA RESULTS DOCUMENT: HCPCS | Performed by: STUDENT IN AN ORGANIZED HEALTH CARE EDUCATION/TRAINING PROGRAM

## 2022-02-09 PROCEDURE — G8754 DIAS BP LESS 90: HCPCS | Performed by: STUDENT IN AN ORGANIZED HEALTH CARE EDUCATION/TRAINING PROGRAM

## 2022-02-09 PROCEDURE — G8510 SCR DEP NEG, NO PLAN REQD: HCPCS | Performed by: STUDENT IN AN ORGANIZED HEALTH CARE EDUCATION/TRAINING PROGRAM

## 2022-02-09 RX ORDER — LOSARTAN POTASSIUM 100 MG/1
TABLET ORAL
Qty: 90 TABLET | Refills: 3 | Status: SHIPPED | OUTPATIENT
Start: 2022-02-09

## 2022-02-09 NOTE — PROGRESS NOTES
Chief Complaint   Patient presents with    Establish Care    Anxiety     dizziness and chest tightness crowded places since december     Hypertension     130/80 checked at home      1. Have you been to the ER, urgent care clinic since your last visit? Hospitalized since your last visit? No    2. Have you seen or consulted any other health care providers outside of the [address] [place] [address] since your last visit? Include any pap smears or colon screening. Yes, ophthalmology.     Health Maintenance Due   Topic Date Due    Shingrix Vaccine Age 49> (1 of 2) Never done    Pneumococcal 65+ years (2 of 2 - PPSV23) 12/30/2016

## 2022-02-09 NOTE — PROGRESS NOTES
Ronald Jackson is a 68 y.o.  female and presents with    Chief Complaint   Patient presents with   Christian Establish Care    Anxiety     dizziness and chest tightness crowded places since december     Hypertension     130/80 checked at home            Subjective:  Pt is here to establish care    Hypertension follow up:  Taking medications as prescribed: NO, takes metoprolol every other day d/t ow it makes her feels. Checking BP at home: YES  Symptoms: no symptoms  Low sodium diet: YES  Exercise: YES     Feels like she has been overwhelmed, but does not feel like there is a reason to be overwhealmed about. She feels her heart races, starts sweating and is unable to . Feels like she is having issues with being in crowded places, and is getting harder to leave her house. A long time ago she was in Prozac. She did not want to rely on the medication. She also feels there are moments when she just starts crying and is unable to control this.        Patient Active Problem List   Diagnosis Code    Obesity, Class I, BMI 30-34.9 E66.9    Sciatica of left side M54.32    Pain of left hip joint M25.552    Osteopenia M85.80    Essential hypertension I10    Prediabetes R73.03    CKD (chronic kidney disease) stage 3, GFR 30-59 ml/min ([facility]) N18.30    Generalized anxiety disorder F41.1    Diet-controlled hyperlipidemia E78.5      Past Medical History:   Diagnosis Date    Acute midline low back pain with left-sided sciatica 10/5/2016    Benign essential HTN 12/30/2015    CKD (chronic kidney disease), stage III ([address] 12/30/2015    Elevated hemoglobin A1c 11/9/2016    Kidney disease, chronic, stage III (GFR 30-59 ml/min) ([address] 11/9/2016    Memory loss 1/13/2016    Menopause     Obesity, Class I, BMI 30-34.9 12/30/2015    Osteopenia 10/19/2016    Pain of left hip joint 10/5/2016    Post herpetic neuralgia 12/30/2015    Prediabetes 8/23/2017    Sciatica of left side 12/30/2015    White coat syndrome with hypertension 8/23/2017      Past Surgical History:   Procedure Laterality Date    HX HYSTERECTOMY      precancerous polyps, menorrhagia    HX OOPHORECTOMY      HX TONSIL AND ADENOIDECTOMY      VT APPENDECTOMY      VT CHOLECYSTOENTEROSTOMY        Family History   Problem Relation Age of Onset    Hypertension Sister     Breast Cancer Sister 68     Social History     Socioeconomic History    Marital status:      Spouse name: Not on file    Number of children: 0    Years of education: 15    Highest education level: Not on file   Occupational History    Not on file   Tobacco Use    Smoking status: Never Smoker    Smokeless tobacco: Never Used   Vaping Use    Vaping Use: Never used   Substance and Sexual Activity    Alcohol use: Yes     Comment: socially    Drug use: No    Sexual activity: Not on file   Other Topics Concern     Service No    Blood Transfusions Yes    Caffeine Concern No    Occupational Exposure No    Hobby Hazards No    Sleep Concern No    Stress Concern Yes    Weight Concern Yes    Special Diet No    Back Care Yes    Exercise Yes    Bike Helmet No    Seat Belt Yes    Self-Exams Yes   Social History Narrative    Not on file     Social Determinants of Health     Financial Resource Strain:     Difficulty of Paying Living Expenses: Not on file   Food Insecurity:     Worried About Running Out of Food in the Last Year: Not on file    Shea of Food in the Last Year: Not on file   Transportation Needs:     Lack of Transportation (Medical): Not on file    Lack of Transportation (Non-Medical):  Not on file   Physical Activity:     Days of Exercise per Week: Not on file    Minutes of Exercise per Session: Not on file   Stress:     Feeling of Stress : Not on file   Social Connections:     Frequency of Communication with Friends and Family: Not on file    Frequency of Social Gatherings with Friends and Family: Not on file    Attends Islam Services: Not on file   [address] [facility] [employer] or Organizations: Not on file    Attends Club or Organization Meetings: Not on file    Marital Status: Not on file   Intimate Partner Violence:     Fear of Current or Ex-Partner: Not on file    Emotionally Abused: Not on file    Physically Abused: Not on file    Sexually Abused: Not on file   Housing Stability:     Unable to Pay for Housing in the Last Year: Not on file    Number of [place] in the Last Year: Not on file    Unstable Housing in the Last Year: Not on file        Current Outpatient Medications   Medication Sig Dispense Refill    Biotin 2,500 mcg cap Take  by mouth daily.  metoprolol succinate (TOPROL-XL) 25 mg XL tablet Take 1 Tablet by mouth nightly. (Patient taking differently: Take 25 mg by mouth nightly. Every other day) 90 Tablet 0    coenzyme Q-10 (Co Q-10) 200 mg capsule Take 200 mg by mouth daily.  losartan (COZAAR) 100 mg tablet TAKE ONE TABLET BY MOUTH ONE TIME DAILY 90 Tablet 3    lysine (L-LYSINE) 500 mg tab tablet Take 500 mg by mouth daily.  CHOLECALCIFEROL, VITAMIN D3, (VITAMIN D3 PO) Take  by mouth.  multivitamin (ONE A DAY) tablet Take 1 Tab by mouth daily. ROS   Review of Systems   Constitutional: Negative for chills, fever and malaise/fatigue. HENT: Negative for congestion, ear discharge and ear pain. Eyes: Negative for blurred vision, pain and discharge. Respiratory: Negative for cough and shortness of breath. Cardiovascular: Negative for chest pain and palpitations. Gastrointestinal: Negative for abdominal pain, nausea and vomiting. Genitourinary: Negative for dysuria, frequency and urgency. Skin: Negative for itching and rash. Neurological: Negative for dizziness, seizures, loss of consciousness and headaches. Psychiatric/Behavioral: Negative for substance abuse. The patient is nervous/anxious.             Objective:  Vitals:    02/09/22 1123   BP: 136/85   Pulse: 82   Resp: 16 Temp: 97.9 °F (36.6 °C)   TempSrc: Temporal   SpO2: 97%   Weight: 196 lb 12.8 oz (89.3 kg)   Height: 5' 6\" (1.676 m)   PainSc:   0 - No pain       Physical Exam  Vitals reviewed. Constitutional:       Appearance: Normal appearance. Eyes:      General: No scleral icterus. Right eye: No discharge. Left eye: No discharge. Cardiovascular:      Rate and Rhythm: Regular rhythm. Tachycardia present. Pulses: Normal pulses. Pulmonary:      Effort: Pulmonary effort is normal. No respiratory distress. Breath sounds: Normal breath sounds. Musculoskeletal:      Cervical back: Normal range of motion and neck supple. Neurological:      Mental Status: She is alert and oriented to person, place, and time. Cranial Nerves: No cranial nerve deficit. Psychiatric:         Mood and Affect: Mood is anxious. Affect is tearful. Behavior: Behavior normal.         Thought Content: Thought content normal.         Judgment: Judgment normal.           LABS     TESTS      Assessment/Plan:    1. Benign essential HTN  stable  - losartan (COZAAR) 100 mg tablet; TAKE ONE TABLET BY MOUTH ONE TIME DAILY  Dispense: 90 Tablet; Refill: 3    2. Palpitations  Will r/o physiological issues  - EKG, 12 LEAD, INITIAL; Future  - TSH 3RD GENERATION; Future  - METABOLIC PANEL, COMPREHENSIVE; Future    3. Stage 3b chronic kidney disease (HCC)  - METABOLIC PANEL, COMPREHENSIVE; Future    4.. Anxiety  Discussed possibility of restarting Prozac. Will get results from EKG and blood work to r/o physiological causes. Lab review: orders written for new lab studies as appropriate; see orders      I have discussed the diagnosis with the patient and the intended plan as seen in the above orders. The patient has received an after-visit summary and questions were answered concerning future plans. I have discussed medication side effects and warnings with the patient as well.  I have reviewed the plan of care with the patient, accepted their input and they are in agreement with the treatment goals.          Kiarra Jennings MD

## 2022-02-11 ENCOUNTER — HOSPITAL ENCOUNTER (OUTPATIENT)
Dept: LAB | Age: 77
Discharge: HOME | End: 2022-02-11
Payer: MEDICARE

## 2022-02-11 DIAGNOSIS — Z12.11 SCREENING FOR MALIGNANT NEOPLASM OF COLON: ICD-10-CM

## 2022-02-11 DIAGNOSIS — N18.32 STAGE 3B CHRONIC KIDNEY DISEASE (HCC): ICD-10-CM

## 2022-02-11 DIAGNOSIS — R00.2 PALPITATIONS: ICD-10-CM

## 2022-02-11 LAB
ALBUMIN SERPL-MCNC: 3.8 G/DL (ref 3.4–5)
ALBUMIN/GLOB SERPL: 1.1 {RATIO} (ref 0.8–1.7)
ALP SERPL-CCNC: 92 U/L (ref 45–117)
ALT SERPL-CCNC: 22 U/L (ref 13–56)
ANION GAP SERPL CALC-SCNC: 3 MMOL/L (ref 3–18)
AST SERPL-CCNC: 22 U/L (ref 10–38)
BILIRUB SERPL-MCNC: 0.6 MG/DL (ref 0.2–1)
BUN SERPL-MCNC: 11 MG/DL (ref 7–18)
BUN/CREAT SERPL: 10 (ref 12–20)
CALCIUM SERPL-MCNC: 9.6 MG/DL (ref 8.5–10.1)
CHLORIDE SERPL-SCNC: 108 MMOL/L (ref 100–111)
CO2 SERPL-SCNC: 29 MMOL/L (ref 21–32)
CREAT SERPL-MCNC: 1.12 MG/DL (ref 0.6–1.3)
GLOBULIN SER CALC-MCNC: 3.6 G/DL (ref 2–4)
GLUCOSE SERPL-MCNC: 86 MG/DL (ref 74–99)
POTASSIUM SERPL-SCNC: 4.4 MMOL/L (ref 3.5–5.5)
PROT SERPL-MCNC: 7.4 G/DL (ref 6.4–8.2)
SODIUM SERPL-SCNC: 140 MMOL/L (ref 136–145)
TSH SERPL DL<=0.05 MIU/L-ACNC: 1.64 UIU/ML (ref 0.36–3.74)

## 2022-02-11 PROCEDURE — 36415 COLL VENOUS BLD VENIPUNCTURE: CPT

## 2022-02-11 PROCEDURE — 93005 ELECTROCARDIOGRAM TRACING: CPT

## 2022-02-11 PROCEDURE — 80053 COMPREHEN METABOLIC PANEL: CPT

## 2022-02-11 PROCEDURE — 84443 ASSAY THYROID STIM HORMONE: CPT

## 2022-02-14 ENCOUNTER — OFFICE VISIT (OUTPATIENT)
Dept: FAMILY MEDICINE CLINIC | Age: 77
End: 2022-02-14
Payer: MEDICARE

## 2022-02-14 VITALS
BODY MASS INDEX: 32.02 KG/M2 | HEART RATE: 85 BPM | OXYGEN SATURATION: 98 % | SYSTOLIC BLOOD PRESSURE: 143 MMHG | RESPIRATION RATE: 16 BRPM | WEIGHT: 199.2 LBS | HEIGHT: 66 IN | DIASTOLIC BLOOD PRESSURE: 86 MMHG | TEMPERATURE: 97 F

## 2022-02-14 DIAGNOSIS — H25.9 AGE-RELATED CATARACT OF BOTH EYES, UNSPECIFIED AGE-RELATED CATARACT TYPE: Primary | ICD-10-CM

## 2022-02-14 DIAGNOSIS — F41.9 ANXIETY: ICD-10-CM

## 2022-02-14 DIAGNOSIS — Z01.818 PREOP EXAMINATION: ICD-10-CM

## 2022-02-14 LAB
ATRIAL RATE: 72 BPM
CALCULATED P AXIS, ECG09: 76 DEGREES
CALCULATED R AXIS, ECG10: 54 DEGREES
CALCULATED T AXIS, ECG11: 56 DEGREES
DIAGNOSIS, 93000: NORMAL
P-R INTERVAL, ECG05: 152 MS
Q-T INTERVAL, ECG07: 402 MS
QRS DURATION, ECG06: 66 MS
QTC CALCULATION (BEZET), ECG08: 440 MS
VENTRICULAR RATE, ECG03: 72 BPM

## 2022-02-14 PROCEDURE — G8754 DIAS BP LESS 90: HCPCS | Performed by: STUDENT IN AN ORGANIZED HEALTH CARE EDUCATION/TRAINING PROGRAM

## 2022-02-14 PROCEDURE — 1101F PT FALLS ASSESS-DOCD LE1/YR: CPT | Performed by: STUDENT IN AN ORGANIZED HEALTH CARE EDUCATION/TRAINING PROGRAM

## 2022-02-14 PROCEDURE — G8399 PT W/DXA RESULTS DOCUMENT: HCPCS | Performed by: STUDENT IN AN ORGANIZED HEALTH CARE EDUCATION/TRAINING PROGRAM

## 2022-02-14 PROCEDURE — G8536 NO DOC ELDER MAL SCRN: HCPCS | Performed by: STUDENT IN AN ORGANIZED HEALTH CARE EDUCATION/TRAINING PROGRAM

## 2022-02-14 PROCEDURE — G8753 SYS BP > OR = 140: HCPCS | Performed by: STUDENT IN AN ORGANIZED HEALTH CARE EDUCATION/TRAINING PROGRAM

## 2022-02-14 PROCEDURE — 99213 OFFICE O/P EST LOW 20 MIN: CPT | Performed by: STUDENT IN AN ORGANIZED HEALTH CARE EDUCATION/TRAINING PROGRAM

## 2022-02-14 PROCEDURE — G8510 SCR DEP NEG, NO PLAN REQD: HCPCS | Performed by: STUDENT IN AN ORGANIZED HEALTH CARE EDUCATION/TRAINING PROGRAM

## 2022-02-14 PROCEDURE — G8428 CUR MEDS NOT DOCUMENT: HCPCS | Performed by: STUDENT IN AN ORGANIZED HEALTH CARE EDUCATION/TRAINING PROGRAM

## 2022-02-14 PROCEDURE — 1090F PRES/ABSN URINE INCON ASSESS: CPT | Performed by: STUDENT IN AN ORGANIZED HEALTH CARE EDUCATION/TRAINING PROGRAM

## 2022-02-14 PROCEDURE — G8417 CALC BMI ABV UP PARAM F/U: HCPCS | Performed by: STUDENT IN AN ORGANIZED HEALTH CARE EDUCATION/TRAINING PROGRAM

## 2022-02-14 NOTE — PROGRESS NOTES
Chief Complaint:  Consult for pre-operative evaluation. History of Present Illness:  Ms. Shasta Fraser is a 68 y.o. female with past medical history significant for   Past Medical History:   Diagnosis Date    Acute midline low back pain with left-sided sciatica 10/5/2016    Benign essential HTN 12/30/2015    CKD (chronic kidney disease), stage III (HCC) 12/30/2015    Elevated hemoglobin A1c 11/9/2016    Kidney disease, chronic, stage III (GFR 30-59 ml/min) ([address] 11/9/2016    Memory loss 1/13/2016    Menopause     Obesity, Class I, BMI 30-34.9 12/30/2015    Osteopenia 10/19/2016    Pain of left hip joint 10/5/2016    Post herpetic neuralgia 12/30/2015    Prediabetes 8/23/2017    Sciatica of left side 12/30/2015    White coat syndrome with hypertension 8/23/2017   . Presents today for the pre-operative evaluation. Procedure to be performed: Phaco w/ IOL  Procedure to be performed by: Dr. Zane Oakes  Procedure [facility] be performed at: [facility]  Procedure to be performed on: 2/24/2022    Patient has had history of surgeries in the past, however has not had any complications with anesthesia.     Other complains today include: none    Allergies   Allergen Reactions    Amlodipine Rash     Past Medical History:   Diagnosis Date    Acute midline low back pain with left-sided sciatica 10/5/2016    Benign essential HTN 12/30/2015    CKD (chronic kidney disease), stage III (HCC) 12/30/2015    Elevated hemoglobin A1c 11/9/2016    Kidney disease, chronic, stage III (GFR 30-59 ml/min) ([facility] [address] 11/9/2016    Memory loss 1/13/2016    Menopause     Obesity, Class I, BMI 30-34.9 12/30/2015    Osteopenia 10/19/2016    Pain of left hip joint 10/5/2016    Post herpetic neuralgia 12/30/2015    Prediabetes 8/23/2017    Sciatica of left side 12/30/2015    White coat syndrome with hypertension 8/23/2017     Past Surgical History:   Procedure Laterality Date    HX HYSTERECTOMY      precancerous polyps, menorrhagia    HX OOPHORECTOMY      HX TONSIL AND ADENOIDECTOMY      IA APPENDECTOMY      IA CHOLECYSTOENTEROSTOMY       Family History   Problem Relation Age of Onset    Hypertension Sister     Breast Cancer Sister 68     Social History     Socioeconomic History    Marital status:      Spouse name: Not on file    Number of children: 0    Years of education: 15    Highest education level: Not on file   Occupational History    Not on file   Tobacco Use    Smoking status: Never Smoker    Smokeless tobacco: Never Used   Vaping Use    Vaping Use: Never used   Substance and Sexual Activity    Alcohol use: Yes     Comment: socially    Drug use: No    Sexual activity: Not on file   Other Topics Concern     Service No    Blood Transfusions Yes    Caffeine Concern No    Occupational Exposure No    Hobby Hazards No    Sleep Concern No    Stress Concern Yes    Weight Concern Yes    Special Diet No    Back Care Yes    Exercise Yes    Bike Helmet No    Seat Belt Yes    Self-Exams Yes   Social History Narrative    Not on file     Social Determinants of Health     Financial Resource Strain:     Difficulty of Paying Living Expenses: Not on file   Food Insecurity:     Worried About Running Out of Food in the Last Year: Not on file    Shea of Food in the Last Year: Not on file   Transportation Needs:     Lack of Transportation (Medical): Not on file    Lack of Transportation (Non-Medical):  Not on file   Physical Activity:     Days of Exercise per Week: Not on file    Minutes of Exercise per Session: Not on file   Stress:     Feeling of Stress : Not on file   Social Connections:     Frequency of Communication with Friends and Family: Not on file    Frequency of Social Gatherings with Friends and Family: Not on file    Attends Baptism Services: Not on file    Active Member of Clubs or Organizations: Not on file    Attends Club or Organization Meetings: Not on file  Marital Status: Not on file   Intimate Partner Violence:     Fear of Current or Ex-Partner: Not on file    Emotionally Abused: Not on file    Physically Abused: Not on file    Sexually Abused: Not on file   Housing Stability:     Unable to Pay for Housing in the Last Year: Not on file    Number of [place] in the Last Year: Not on file    Unstable Housing in the Last Year: Not on file     Current Outpatient Medications   Medication Sig Dispense Refill    losartan (COZAAR) 100 mg tablet TAKE ONE TABLET BY MOUTH ONE TIME DAILY 90 Tablet 3    Biotin 2,500 mcg cap Take  by mouth daily.  metoprolol succinate (TOPROL-XL) 25 mg XL tablet Take 1 Tablet by mouth nightly. (Patient taking differently: Take 25 mg by mouth nightly. Every other day) 90 Tablet 0    coenzyme Q-10 (Co Q-10) 200 mg capsule Take 200 mg by mouth daily.  lysine (L-LYSINE) 500 mg tab tablet Take 500 mg by mouth daily.  CHOLECALCIFEROL, VITAMIN D3, (VITAMIN D3 PO) Take  by mouth.  multivitamin (ONE A DAY) tablet Take 1 Tab by mouth daily. ROS   Review of Systems   Constitutional: Negative for chills, fever and malaise/fatigue. HENT: Negative for congestion, ear discharge and ear pain. Eyes: Negative for blurred vision, pain and discharge. Respiratory: Negative for cough and shortness of breath. Cardiovascular: Negative for chest pain. Gastrointestinal: Negative for abdominal pain, nausea and vomiting. Genitourinary: Negative for dysuria, frequency and urgency. Skin: Negative for itching and rash. Neurological: Negative for dizziness, seizures, loss of consciousness and headaches. Psychiatric/Behavioral: Negative for substance abuse.          Objective:  Vitals:    02/14/22 0846 02/14/22 0849   BP: (!) 147/89 (!) 143/86   Pulse: 85    Resp: 16    Temp: 97 °F (36.1 °C)    TempSrc: Temporal    SpO2: 98%    Weight: 199 lb 3.2 oz (90.4 kg)    Height: 5' 6\" (1.676 m)    PainSc:   0 - No pain Physical Exam  Vitals reviewed. Constitutional:       Appearance: Normal appearance. HENT:      Right Ear: Tympanic membrane, ear canal and external ear normal. There is no impacted cerumen. Left Ear: Tympanic membrane, ear canal and external ear normal. There is no impacted cerumen. Nose: Nose normal. No congestion or rhinorrhea. Mouth/Throat:      Mouth: Mucous membranes are dry. Pharynx: Oropharynx is clear. No oropharyngeal exudate or posterior oropharyngeal erythema. Eyes:      General: No scleral icterus. Right eye: No discharge. Left eye: No discharge. Extraocular Movements: Extraocular movements intact. Cardiovascular:      Rate and Rhythm: Normal rate and regular rhythm. Heart sounds: No murmur heard. Pulmonary:      Effort: Pulmonary effort is normal. No respiratory distress. Breath sounds: Normal breath sounds. No stridor. No wheezing, rhonchi or rales. Chest:      Chest wall: No tenderness. Abdominal:      General: Abdomen is flat. Bowel sounds are normal.      Palpations: Abdomen is soft. Tenderness: There is no abdominal tenderness. Musculoskeletal:         General: No swelling, tenderness, deformity or signs of injury. Normal range of motion. Cervical back: Normal range of motion and neck supple. Right lower leg: No edema. Left lower leg: No edema. Lymphadenopathy:      Cervical: No cervical adenopathy. Skin:     General: Skin is warm and dry. Neurological:      Mental Status: She is alert and oriented to person, place, and time. Cranial Nerves: No cranial nerve deficit. Deep Tendon Reflexes: Reflexes normal.   Psychiatric:         Mood and Affect: Mood normal.         Behavior: Behavior normal.         Thought Content: Thought content normal.           LABS     TESTS      Assessment/Plan:      1. Age-related cataract of both eyes, unspecified age-related cataract type    2.  Preop examination  This individual is at low risk for cardiovascular event during the low risk surgery. Based on the [place] perioperative cardiac risk, patient's estimated risk probability for perioperative myocardial infarct or cardiac arrest is: 0.2% The revised cardiovascular risk index is <2 and is associated with a <7 percent risk of major cardiovascular events. Ms. Enoch Combs does have the ability to perform > 4 MET levels of activity and has no active cardiac conditions. Ms. Enoch Combs has been medically optimized to have this procedure. 3. Anxiety  Pt declines medical intervention at his time. She wants to try natural remedies to see if she can handle her anxiety. Discussed w/ her if at any time she would like to go back on medicine to let me know. Pt verbalized understanding. I have discussed the diagnosis with the patient and the intended plan as seen in the above orders. The patient has received an after-visit summary and questions were answered concerning future plans. I have discussed medication side effects and warnings with the patient as well. I have reviewed the plan of care with the patient, accepted their input and they are in agreement with the treatment goals.          Molina Dent MD

## 2022-02-14 NOTE — PROGRESS NOTES
Chief Complaint   Patient presents with    Pre-op Exam     left eye cataract surgery 2/24/22     1. Have you been to the ER, urgent care clinic since your last visit? Hospitalized since your last visit? No    2. Have you seen or consulted any other health care providers outside of the [address] [place] [address] since your last visit? Include any pap smears or colon screening.  No     Health Maintenance Due   Topic Date Due    Shingrix Vaccine Age 49> (1 of 2) Never done    Pneumococcal 65+ years (2 of 2 - PPSV23) 12/30/2016

## 2022-03-18 PROBLEM — F41.1 GENERALIZED ANXIETY DISORDER: Status: ACTIVE | Noted: 2021-08-26

## 2022-03-18 PROBLEM — I10 ESSENTIAL HYPERTENSION: Status: ACTIVE | Noted: 2017-08-23

## 2022-03-18 PROBLEM — N18.30 CKD (CHRONIC KIDNEY DISEASE) STAGE 3, GFR 30-59 ML/MIN (HCC): Status: ACTIVE | Noted: 2020-10-15

## 2022-03-18 PROBLEM — E78.5 DIET-CONTROLLED HYPERLIPIDEMIA: Status: ACTIVE | Noted: 2021-11-29

## 2022-03-19 PROBLEM — R73.03 PREDIABETES: Status: ACTIVE | Noted: 2017-08-23

## 2023-04-18 RX ORDER — LOSARTAN POTASSIUM 100 MG/1
100 TABLET ORAL DAILY
Qty: 90 TABLET | Refills: 1 | Status: SHIPPED | OUTPATIENT
Start: 2023-04-18

## 2023-05-14 SDOH — HEALTH STABILITY: PHYSICAL HEALTH: ON AVERAGE, HOW MANY MINUTES DO YOU ENGAGE IN EXERCISE AT THIS LEVEL?: 30 MIN

## 2023-05-14 SDOH — ECONOMIC STABILITY: FOOD INSECURITY: WITHIN THE PAST 12 MONTHS, YOU WORRIED THAT YOUR FOOD WOULD RUN OUT BEFORE YOU GOT MONEY TO BUY MORE.: SOMETIMES TRUE

## 2023-05-14 SDOH — ECONOMIC STABILITY: HOUSING INSECURITY
IN THE LAST 12 MONTHS, WAS THERE A TIME WHEN YOU DID NOT HAVE A STEADY PLACE TO SLEEP OR SLEPT IN A SHELTER (INCLUDING NOW)?: NO

## 2023-05-14 SDOH — ECONOMIC STABILITY: TRANSPORTATION INSECURITY
IN THE PAST 12 MONTHS, HAS LACK OF TRANSPORTATION KEPT YOU FROM MEETINGS, WORK, OR FROM GETTING THINGS NEEDED FOR DAILY LIVING?: YES

## 2023-05-14 SDOH — ECONOMIC STABILITY: INCOME INSECURITY: HOW HARD IS IT FOR YOU TO PAY FOR THE VERY BASICS LIKE FOOD, HOUSING, MEDICAL CARE, AND HEATING?: SOMEWHAT HARD

## 2023-05-14 SDOH — ECONOMIC STABILITY: FOOD INSECURITY: WITHIN THE PAST 12 MONTHS, THE FOOD YOU BOUGHT JUST DIDN'T LAST AND YOU DIDN'T HAVE MONEY TO GET MORE.: SOMETIMES TRUE

## 2023-05-14 SDOH — HEALTH STABILITY: PHYSICAL HEALTH: ON AVERAGE, HOW MANY DAYS PER WEEK DO YOU ENGAGE IN MODERATE TO STRENUOUS EXERCISE (LIKE A BRISK WALK)?: 5 DAYS

## 2023-05-14 ASSESSMENT — LIFESTYLE VARIABLES
HOW MANY STANDARD DRINKS CONTAINING ALCOHOL DO YOU HAVE ON A TYPICAL DAY: 0
HOW MANY STANDARD DRINKS CONTAINING ALCOHOL DO YOU HAVE ON A TYPICAL DAY: PATIENT DOES NOT DRINK
HOW OFTEN DO YOU HAVE SIX OR MORE DRINKS ON ONE OCCASION: 1

## 2023-05-14 ASSESSMENT — PATIENT HEALTH QUESTIONNAIRE - PHQ9
SUM OF ALL RESPONSES TO PHQ QUESTIONS 1-9: 1
SUM OF ALL RESPONSES TO PHQ QUESTIONS 1-9: 1
2. FEELING DOWN, DEPRESSED OR HOPELESS: 0
SUM OF ALL RESPONSES TO PHQ QUESTIONS 1-9: 1
SUM OF ALL RESPONSES TO PHQ9 QUESTIONS 1 & 2: 1
SUM OF ALL RESPONSES TO PHQ QUESTIONS 1-9: 1
1. LITTLE INTEREST OR PLEASURE IN DOING THINGS: 1

## 2023-05-17 ENCOUNTER — HOSPITAL ENCOUNTER (OUTPATIENT)
Facility: HOSPITAL | Age: 78
Setting detail: SPECIMEN
Discharge: HOME | End: 2023-05-20
Payer: MEDICARE

## 2023-05-17 ENCOUNTER — OFFICE VISIT (OUTPATIENT)
Facility: CLINIC | Age: 78
End: 2023-05-17
Payer: MEDICARE

## 2023-05-17 VITALS
SYSTOLIC BLOOD PRESSURE: 146 MMHG | WEIGHT: 196 LBS | DIASTOLIC BLOOD PRESSURE: 90 MMHG | OXYGEN SATURATION: 98 % | TEMPERATURE: 98.4 F | RESPIRATION RATE: 16 BRPM | BODY MASS INDEX: 31.5 KG/M2 | HEIGHT: 66 IN | HEART RATE: 74 BPM

## 2023-05-17 DIAGNOSIS — R09.89 UNEQUAL BLOOD PRESSURE IN UPPER EXTREMITIES: ICD-10-CM

## 2023-05-17 DIAGNOSIS — R73.03 PREDIABETES: ICD-10-CM

## 2023-05-17 DIAGNOSIS — I10 ESSENTIAL (PRIMARY) HYPERTENSION: ICD-10-CM

## 2023-05-17 DIAGNOSIS — N18.32 CHRONIC KIDNEY DISEASE, STAGE 3B (HCC): ICD-10-CM

## 2023-05-17 DIAGNOSIS — Z00.00 MEDICARE ANNUAL WELLNESS VISIT, SUBSEQUENT: Primary | ICD-10-CM

## 2023-05-17 DIAGNOSIS — Z12.31 ENCOUNTER FOR SCREENING MAMMOGRAM FOR MALIGNANT NEOPLASM OF BREAST: ICD-10-CM

## 2023-05-17 LAB
ALBUMIN SERPL-MCNC: 3.8 G/DL (ref 3.4–5)
ALBUMIN/GLOB SERPL: 1 (ref 0.8–1.7)
ALP SERPL-CCNC: 87 U/L (ref 45–117)
ALT SERPL-CCNC: 26 U/L (ref 13–56)
AMORPH CRY URNS QL MICRO: ABNORMAL
ANION GAP SERPL CALC-SCNC: 6 MMOL/L (ref 3–18)
APPEARANCE UR: ABNORMAL
AST SERPL-CCNC: 26 U/L (ref 10–38)
BACTERIA URNS QL MICRO: NEGATIVE /HPF
BILIRUB SERPL-MCNC: 0.6 MG/DL (ref 0.2–1)
BILIRUB UR QL: NEGATIVE
BUN SERPL-MCNC: 17 MG/DL (ref 7–18)
BUN/CREAT SERPL: 14 (ref 12–20)
CALCIUM SERPL-MCNC: 9.7 MG/DL (ref 8.5–10.1)
CHLORIDE SERPL-SCNC: 109 MMOL/L (ref 100–111)
CHOLEST SERPL-MCNC: 203 MG/DL
CO2 SERPL-SCNC: 27 MMOL/L (ref 21–32)
COLOR UR: YELLOW
CREAT SERPL-MCNC: 1.18 MG/DL (ref 0.6–1.3)
EPITH CASTS URNS QL MICRO: ABNORMAL /LPF (ref 0–5)
ERYTHROCYTE [DISTWIDTH] IN BLOOD BY AUTOMATED COUNT: 13.8 % (ref 11.6–14.5)
EST. AVERAGE GLUCOSE BLD GHB EST-MCNC: 114 MG/DL
GLOBULIN SER CALC-MCNC: 4 G/DL (ref 2–4)
GLUCOSE SERPL-MCNC: 99 MG/DL (ref 74–99)
GLUCOSE UR STRIP.AUTO-MCNC: NEGATIVE MG/DL
HBA1C MFR BLD: 5.6 % (ref 4.2–5.6)
HCT VFR BLD AUTO: 46.2 % (ref 35–45)
HDLC SERPL-MCNC: 61 MG/DL (ref 40–60)
HDLC SERPL: 3.3 (ref 0–5)
HGB BLD-MCNC: 14.9 G/DL (ref 12–16)
HGB UR QL STRIP: NEGATIVE
HYALINE CASTS URNS QL MICRO: ABNORMAL /LPF (ref 0–2)
KETONES UR QL STRIP.AUTO: NEGATIVE MG/DL
LDLC SERPL CALC-MCNC: 125.8 MG/DL (ref 0–100)
LEUKOCYTE ESTERASE UR QL STRIP.AUTO: ABNORMAL
LIPID PANEL: ABNORMAL
MCH RBC QN AUTO: 29.2 PG (ref 24–34)
MCHC RBC AUTO-ENTMCNC: 32.3 G/DL (ref 31–37)
MCV RBC AUTO: 90.6 FL (ref 78–100)
MUCOUS THREADS URNS QL MICRO: ABNORMAL /LPF
NITRITE UR QL STRIP.AUTO: NEGATIVE
NRBC # BLD: 0 K/UL (ref 0–0.01)
NRBC BLD-RTO: 0 PER 100 WBC
PH UR STRIP: 5.5 (ref 5–8)
PLATELET # BLD AUTO: 199 K/UL (ref 135–420)
PMV BLD AUTO: 12.4 FL (ref 9.2–11.8)
POTASSIUM SERPL-SCNC: 4.3 MMOL/L (ref 3.5–5.5)
PROT SERPL-MCNC: 7.8 G/DL (ref 6.4–8.2)
PROT UR STRIP-MCNC: 30 MG/DL
RBC # BLD AUTO: 5.1 M/UL (ref 4.2–5.3)
RBC #/AREA URNS HPF: ABNORMAL /HPF (ref 0–5)
SODIUM SERPL-SCNC: 142 MMOL/L (ref 136–145)
SP GR UR REFRACTOMETRY: 1.02 (ref 1–1.03)
TRIGL SERPL-MCNC: 81 MG/DL
TSH SERPL DL<=0.05 MIU/L-ACNC: 2.15 UIU/ML (ref 0.36–3.74)
UROBILINOGEN UR QL STRIP.AUTO: 1 EU/DL (ref 0.2–1)
VLDLC SERPL CALC-MCNC: 16.2 MG/DL
WBC # BLD AUTO: 8.9 K/UL (ref 4.6–13.2)
WBC URNS QL MICRO: ABNORMAL /HPF (ref 0–4)

## 2023-05-17 PROCEDURE — G0439 PPPS, SUBSEQ VISIT: HCPCS | Performed by: STUDENT IN AN ORGANIZED HEALTH CARE EDUCATION/TRAINING PROGRAM

## 2023-05-17 PROCEDURE — 1036F TOBACCO NON-USER: CPT | Performed by: STUDENT IN AN ORGANIZED HEALTH CARE EDUCATION/TRAINING PROGRAM

## 2023-05-17 PROCEDURE — 81001 URINALYSIS AUTO W/SCOPE: CPT

## 2023-05-17 PROCEDURE — 85027 COMPLETE CBC AUTOMATED: CPT

## 2023-05-17 PROCEDURE — G8399 PT W/DXA RESULTS DOCUMENT: HCPCS | Performed by: STUDENT IN AN ORGANIZED HEALTH CARE EDUCATION/TRAINING PROGRAM

## 2023-05-17 PROCEDURE — 36415 COLL VENOUS BLD VENIPUNCTURE: CPT

## 2023-05-17 PROCEDURE — 80053 COMPREHEN METABOLIC PANEL: CPT

## 2023-05-17 PROCEDURE — 99214 OFFICE O/P EST MOD 30 MIN: CPT | Performed by: STUDENT IN AN ORGANIZED HEALTH CARE EDUCATION/TRAINING PROGRAM

## 2023-05-17 PROCEDURE — 80061 LIPID PANEL: CPT

## 2023-05-17 PROCEDURE — 1123F ACP DISCUSS/DSCN MKR DOCD: CPT | Performed by: STUDENT IN AN ORGANIZED HEALTH CARE EDUCATION/TRAINING PROGRAM

## 2023-05-17 PROCEDURE — 84443 ASSAY THYROID STIM HORMONE: CPT

## 2023-05-17 PROCEDURE — G8427 DOCREV CUR MEDS BY ELIG CLIN: HCPCS | Performed by: STUDENT IN AN ORGANIZED HEALTH CARE EDUCATION/TRAINING PROGRAM

## 2023-05-17 PROCEDURE — 3079F DIAST BP 80-89 MM HG: CPT | Performed by: STUDENT IN AN ORGANIZED HEALTH CARE EDUCATION/TRAINING PROGRAM

## 2023-05-17 PROCEDURE — 1090F PRES/ABSN URINE INCON ASSESS: CPT | Performed by: STUDENT IN AN ORGANIZED HEALTH CARE EDUCATION/TRAINING PROGRAM

## 2023-05-17 PROCEDURE — 3075F SYST BP GE 130 - 139MM HG: CPT | Performed by: STUDENT IN AN ORGANIZED HEALTH CARE EDUCATION/TRAINING PROGRAM

## 2023-05-17 PROCEDURE — 83036 HEMOGLOBIN GLYCOSYLATED A1C: CPT

## 2023-05-17 PROCEDURE — G8417 CALC BMI ABV UP PARAM F/U: HCPCS | Performed by: STUDENT IN AN ORGANIZED HEALTH CARE EDUCATION/TRAINING PROGRAM

## 2023-05-17 ASSESSMENT — VISUAL ACUITY
OS_CC: 20/40
OD_CC: 20/40

## 2023-05-17 ASSESSMENT — ENCOUNTER SYMPTOMS
CONSTIPATION: 0
ABDOMINAL PAIN: 0
EYE ITCHING: 0
VOMITING: 0
SHORTNESS OF BREATH: 0
EYE PAIN: 0
BACK PAIN: 0
DIARRHEA: 0
EYE DISCHARGE: 0
COLOR CHANGE: 0
FACIAL SWELLING: 0
EYE REDNESS: 0

## 2023-05-17 ASSESSMENT — LIFESTYLE VARIABLES: HOW MANY STANDARD DRINKS CONTAINING ALCOHOL DO YOU HAVE ON A TYPICAL DAY: PATIENT DOES NOT DRINK

## 2023-05-17 NOTE — PATIENT INSTRUCTIONS
Fatigue: Care Instructions  Your Care Instructions     Fatigue is a feeling of tiredness, exhaustion, or lack of energy. You may feel fatigue because of too much or not enough activity. It can also come from stress, lack of sleep, boredom, and poor diet. Many medical problems, such as viral infections, can cause fatigue. Emotional problems, especially depression, are often the cause of fatigue. Fatigue is most often a symptom of another problem. Treatment for fatigue depends on the cause. For example, if you have fatigue because you have a certain health problem, treating this problem also treats your fatigue. If depression or anxiety is the cause, treatment may help. Follow-up care is a key part of your treatment and safety. Be sure to make and go to all appointments, and call your doctor if you are having problems. It's also a good idea to know your test results and keep a list of the medicines you take. How can you care for yourself at home? Get regular exercise. But don't overdo it. Go back and forth between rest and exercise. Get plenty of rest.  Eat a healthy diet. Do not skip meals, especially breakfast.  Reduce your use of caffeine, tobacco, and alcohol. Caffeine is most often found in coffee, tea, cola drinks, and chocolate. Limit medicines that can cause fatigue. This includes tranquilizers and cold and allergy medicines. When should you call for help? Watch closely for changes in your health, and be sure to contact your doctor if:    You have new symptoms such as fever or a rash.     Your fatigue gets worse.     You have been feeling down, depressed, or hopeless. Or you may have lost interest in things that you usually enjoy.     You are not getting better as expected. Where can you learn more? Go to http://www.woods.com/ and enter W487 to learn more about \"Fatigue: Care Instructions. \"  Current as of: October 20, 2022               Content Version: 13.6  © 8186-2645

## 2023-05-17 NOTE — PROGRESS NOTES
Andrei Gomez is a 68 y.o. presents today for   Chief Complaint   Patient presents with    Medicare AWV     Is someone accompanying this pt? No     Is the patient using any DME equipment during OV? No     Health Maintenance Due   Topic Date Due    Shingles vaccine (1 of 2) Never done    COVID-19 Vaccine (5 - Booster for Moderna series) 07/09/2022    Annual Wellness Visit (AWV)  11/30/2022         Coordination of Care:   1. \"Have you been to the ER, urgent care clinic since your last visit? Hospitalized since your last visit? \" No    2. \"Have you seen or consulted any other health care providers outside of the [address] [place] [address] since your last visit? \" No     3. For patients aged 39-70: Has the patient had a colonoscopy / FIT/ Cologuard? Yes - no Care Gap present      If the patient is female:    4. For patients aged 41-77: Has the patient had a mammogram within the past 2 years? Yes - no Care Gap present      5. For patients aged 21-65: Has the patient had a pap smear?  Yes - no Care Gap present    Bubba Herr
HENT:      Head: Normocephalic. Nose: No congestion or rhinorrhea. Eyes:      General: No scleral icterus. Right eye: No discharge. Left eye: No discharge. Cardiovascular:      Rate and Rhythm: Normal rate and regular rhythm. Pulses: Normal pulses. Carotid pulses are 2+ on the right side and 2+ on the left side. Radial pulses are 2+ on the right side and 2+ on the left side. Heart sounds: Normal heart sounds. Pulmonary:      Effort: Pulmonary effort is normal.      Breath sounds: Normal breath sounds. Abdominal:      General: Abdomen is flat. Palpations: Abdomen is soft. Musculoskeletal:         General: Normal range of motion. Right hand: Normal strength. Normal capillary refill. Left hand: Normal strength. Normal capillary refill. Cervical back: Normal range of motion and neck supple. No rigidity. Skin:     General: Skin is warm and dry. Neurological:      Mental Status: She is alert and oriented to person, place, and time. Gait: Gait normal.   Psychiatric:         Mood and Affect: Mood normal.         Behavior: Behavior normal.         Thought Content: Thought content normal.         Judgment: Judgment normal.         LABS     TESTS      Assessment/Plan:    1. Medicare annual wellness visit, subsequent  See above    2. Chronic kidney disease, stage 3b ([facility] [address]    3. Essential (primary) hypertension  Unequal BPs  - TSH; Future  - CBC; Future  - Comprehensive Metabolic Panel; Future  - Urinalysis; Future  - Lipid Panel; Future  - External Referral To Vascular Surgery    4. Prediabetes  - Hemoglobin A1C; Future    5.  Unequal blood pressure in upper extremities    - External Referral To Vascular Surgery      Lab review: orders written for new lab studies as appropriate; see orders    On this date 5/17/2023 I have spent 30 minutes reviewing previous notes, test results and face to face with the patient discussing the diagnosis and

## 2023-10-19 RX ORDER — LOSARTAN POTASSIUM 100 MG/1
100 TABLET ORAL DAILY
Qty: 90 TABLET | Refills: 0 | Status: SHIPPED | OUTPATIENT
Start: 2023-10-19

## 2023-10-19 NOTE — TELEPHONE ENCOUNTER
This patient contacted the office for the following prescriptions to be refilled:    Medication requested :   Requested Prescriptions     Pending Prescriptions Disp Refills    losartan (COZAAR) 100 MG tablet [Pharmacy Med Name: Losartan Potassium 100 MG Oral Tablet] 90 tablet 0     Sig: Take 1 tablet by mouth once daily      PCP: William Ruiz MD    NOV DMA: 10/18/2023  FUTURE APPT: No future appointments. Thank you.

## 2023-10-23 RX ORDER — LOSARTAN POTASSIUM 100 MG/1
100 TABLET ORAL DAILY
Qty: 90 TABLET | Refills: 1 | Status: SHIPPED | OUTPATIENT
Start: 2023-10-23

## 2023-10-23 NOTE — TELEPHONE ENCOUNTER
This patient contacted the office for the following prescriptions to be refilled:    Medication requested :   Requested Prescriptions     Pending Prescriptions Disp Refills    losartan (COZAAR) 100 MG tablet 90 tablet 1     Sig: Take 1 tablet by mouth daily      PCP: Sunil Richardson MD    NOV DMA: Visit date not found  FUTURE APPT: No future appointments. Thank you.

## 2024-01-17 ENCOUNTER — TELEPHONE (OUTPATIENT)
Facility: CLINIC | Age: 79
End: 2024-01-17

## 2024-01-17 DIAGNOSIS — I10 ESSENTIAL (PRIMARY) HYPERTENSION: ICD-10-CM

## 2024-01-17 DIAGNOSIS — I10 ESSENTIAL (PRIMARY) HYPERTENSION: Primary | ICD-10-CM

## 2024-01-17 RX ORDER — LOSARTAN POTASSIUM 100 MG/1
100 TABLET ORAL DAILY
Qty: 90 TABLET | Refills: 1 | Status: SHIPPED | OUTPATIENT
Start: 2024-01-17 | End: 2024-01-17 | Stop reason: SDUPTHER

## 2024-01-17 RX ORDER — LOSARTAN POTASSIUM 100 MG/1
100 TABLET ORAL DAILY
Qty: 90 TABLET | Refills: 1 | Status: SHIPPED | OUTPATIENT
Start: 2024-01-17

## 2024-01-17 NOTE — TELEPHONE ENCOUNTER
----- Message from Bridget Bell sent at 1/17/2024  1:41 PM EST -----  Subject: Medication Problem     Medication: losartan (COZAAR) 100 MG tablet  Dosage: 1x daily  Ordering Provider: Senia Mijares    Question/Problem: 2 weeks left of medication until 2/14/2024 appt       [facility] [place] [id number] [facility]   [phone] -  [phone]    ---------------------------------------------------------------------------  --------------  CALL BACK INFO  [phone]; OK to leave message on voicemail  ---------------------------------------------------------------------------  --------------    SCRIPT ANSWERS  Relationship to Patient: Self

## 2024-02-14 ENCOUNTER — OFFICE VISIT (OUTPATIENT)
Facility: CLINIC | Age: 79
End: 2024-02-14

## 2024-02-14 VITALS
BODY MASS INDEX: 30.92 KG/M2 | TEMPERATURE: 97.2 F | OXYGEN SATURATION: 99 % | SYSTOLIC BLOOD PRESSURE: 151 MMHG | HEIGHT: 66 IN | WEIGHT: 192.4 LBS | HEART RATE: 78 BPM | DIASTOLIC BLOOD PRESSURE: 84 MMHG | RESPIRATION RATE: 12 BRPM

## 2024-02-14 DIAGNOSIS — M25.552 BILATERAL HIP PAIN: ICD-10-CM

## 2024-02-14 DIAGNOSIS — R73.03 PREDIABETES: ICD-10-CM

## 2024-02-14 DIAGNOSIS — Z00.00 MEDICARE ANNUAL WELLNESS VISIT, SUBSEQUENT: ICD-10-CM

## 2024-02-14 DIAGNOSIS — M54.31 BILATERAL SCIATICA: ICD-10-CM
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
## 2024-02-14 DIAGNOSIS — M54.2 CERVICAL PAIN (NECK): ICD-10-CM

## 2024-02-14 DIAGNOSIS — G62.9 NEUROPATHY: ICD-10-CM

## 2024-02-14 DIAGNOSIS — N18.32 CHRONIC KIDNEY DISEASE, STAGE 3B (HCC): ICD-10-CM

## 2024-02-14 DIAGNOSIS — M54.32 BILATERAL SCIATICA: ICD-10-CM

## 2024-02-14 DIAGNOSIS — I10 ESSENTIAL (PRIMARY) HYPERTENSION: ICD-10-CM

## 2024-02-14 DIAGNOSIS — M25.551 BILATERAL HIP PAIN: ICD-10-CM

## 2024-02-14 RX ORDER — GABAPENTIN 100 MG/1
200 CAPSULE ORAL 3 TIMES DAILY
Qty: 180 CAPSULE | Refills: 1 | Status: SHIPPED | OUTPATIENT
Start: 2024-02-14 | End: 2024-04-14

## 2024-02-14 NOTE — PROGRESS NOTES
Irasema Pineda is a 78 y.o. year old female who presents today for   Chief Complaint   Patient presents with    Follow-up       Is someone accompanying this pt? No     Is the patient using any DME equipment during OV? No     Depression Screenin/14/2024     2:28 PM 2023     8:49 AM 2022     8:44 AM 2022    11:19 AM 2021    10:18 AM 2021     8:32 AM   PHQ-9 Questionaire   Little interest or pleasure in doing things 1 1 0 0 0 0   Feeling down, depressed, or hopeless 2 0 1 2 0 0   Trouble falling or staying asleep, or sleeping too much 0        Feeling tired or having little energy 3        Poor appetite or overeating 0        Feeling bad about yourself - or that you are a failure or have let yourself or your family down 0        Trouble concentrating on things, such as reading the newspaper or watching television 0        Moving or speaking so slowly that other people could have noticed. Or the opposite - being so fidgety or restless that you have been moving around a lot more than usual 0        Thoughts that you would be better off dead, or of hurting yourself in some way 0        PHQ-9 Total Score 6 1 1 2 0 0   If you checked off any problems, how difficult have these problems made it for you to do your work, take care of things at home, or get along with other people? 1            Abuse Screening:       No data to display                Learning Assessment:  No question data found.    Fall Risk:      2023     8:49 AM   Fall Risk   2 or more falls in past year? no   Fall with injury in past year? no           Coordination of Care:   1. \"Have you been to the ER, urgent care clinic since your last visit?  Hospitalized since your last visit?\" No     2. \"Have you seen or consulted any other health care providers outside of the [facility] since your last visit?\" No     3. For patients aged 45-75: Has the patient had a colonoscopy / FIT/ Cologuard? Not due    If the

## 2024-02-14 NOTE — PROGRESS NOTES
Irasema Pineda is a 78 y.o.  female and presents with    Chief Complaint   Patient presents with    Follow-up           Subjective:    Hypertension follow up:  Taking medications as prescribed: NO, takes metoprolol every other day d/t how it makes her feels.   Checking BP at home: YES  Symptoms: no symptoms  Low sodium diet: YES  Exercise: YES    She had a fall 11/2023 that continues to giver her pain in her hip that jams into her spine. She feels that since she feels there is a pain in the center in her back when she has a bowel movement. She feels like the pain is going down her legs and there is a burning pain and it travels from the hip down. The pain is worst on the LLE. She has chronic issues of numbness on her LLE. Used to take Lyrican in th past    Also in the back of her neck and on the R shoulder she has been feeling like she feels like she tore a muscle away from her body. This has been ongoing for about 2 weeks after se feel in 11/2023.       Patient Active Problem List   Diagnosis    Diet-controlled hyperlipidemia    CKD (chronic kidney disease) stage 3, GFR 30-59 ml/min ([facility])    Generalized anxiety disorder    Essential hypertension    Prediabetes    Osteopenia    Obesity, Class I, BMI 30-34.9    Sciatica of left side    Pain of left hip joint      Past Medical History:   Diagnosis Date    Acute midline low back pain with left-sided sciatica 10/5/2016    Benign essential HTN 12/30/2015    Chronic kidney disease     CKD (chronic kidney disease), stage III ([facility]) 12/30/2015    Elevated hemoglobin A1c 11/9/2016    Headache     Kidney disease, chronic, stage III (GFR 30-59 ml/min) ([facility]) 11/9/2016    Memory loss 1/13/2016    Menopause     Obesity, Class I, BMI 30-34.9 12/30/2015    Osteopenia 10/19/2016    Pain of left hip joint 10/5/2016    Post herpetic neuralgia 12/30/2015    Prediabetes 8/23/2017    Sciatica of left side 12/30/2015    White coat syndrome with hypertension 8/23/2017      Past Surgical History:

## 2024-02-14 NOTE — PATIENT INSTRUCTIONS
Star taking Gabapentin 1 pill three times a day, if after 1 week the pain has not improved then take 2 pills in the morning, 1 at midday, and 2 pill at night. If pain not improved after 1 week then increase to 2 pills three times a day.      -----------------------------------------------------------------------------------------  Preventing Falls: Care Instructions  Injuries and health problems such as trouble walking or poor eyesight can increase your risk of falling. So can some medicines. But there are things you can do to help prevent falls. You can exercise to get stronger. You can also arrange your home to make it safer.    Talk to your doctor about the medicines you take. Ask if any of them increase the risk of falls and whether they can be changed or stopped.   Try to exercise regularly. It can help improve your strength and balance. This can help lower your risk of falling.     Practice fall safety and prevention.    Wear low-heeled shoes that fit well and give your feet good support. Talk to your doctor if you have foot problems that make this hard.  Carry a cellphone or wear a medical alert device that you can use to call for help.  Use stepladders instead of chairs to reach high objects. Don't climb if you're at risk for falls. Ask for help, if needed.  Wear the correct eyeglasses, if you need them.    Make your home safer.    Remove rugs, cords, clutter, and furniture from walkways.  Keep your house well lit. Use night-lights in hallways and bathrooms.  Install and use sturdy handrails on stairways.  Wear nonskid footwear, even inside. Don't walk barefoot or in socks without shoes.    Be safe outside.    Use handrails, curb cuts, and ramps whenever possible.  Keep your hands free by using a shoulder bag or backpack.  Try to walk in well-lit areas. Watch out for uneven ground, changes in pavement, and debris.  Be careful in the winter. Walk on the grass or gravel when sidewalks are slippery. Use de-icer

## 2024-02-15 LAB
A/G RATIO: 1.5 RATIO (ref 1.1–2.6)
ALBUMIN SERPL-MCNC: 4.7 G/DL (ref 3.5–5)
ALP BLD-CCNC: 87 U/L (ref 40–120)
ALT SERPL-CCNC: 16 U/L (ref 5–40)
ANION GAP SERPL CALCULATED.3IONS-SCNC: 12 MMOL/L (ref 3–15)
AST SERPL-CCNC: 25 U/L (ref 10–37)
BACTERIA: NEGATIVE
BILIRUB SERPL-MCNC: 0.7 MG/DL (ref 0.2–1.2)
BILIRUB SERPL-MCNC: ABNORMAL MG/DL
BLOOD: NEGATIVE
BUN BLDV-MCNC: 18 MG/DL (ref 6–22)
CALCIUM SERPL-MCNC: 9.8 MG/DL (ref 8.4–10.5)
CHLORIDE BLD-SCNC: 106 MMOL/L (ref 98–110)
CHOLESTEROL/HDL RATIO: 3.5 (ref 0–5)
CHOLESTEROL: 205 MG/DL (ref 110–200)
CLARITY: ABNORMAL
CO2: 26 MMOL/L (ref 20–32)
COLOR: ABNORMAL
CREAT SERPL-MCNC: 1.1 MG/DL (ref 0.8–1.4)
EPITHELIAL CELLS: ABNORMAL /HPF
ESTIMATED AVERAGE GLUCOSE: 115 MG/DL (ref 91–123)
GLOBULIN: 3.1 G/DL (ref 2–4)
GLOMERULAR FILTRATION RATE: 49.2 ML/MIN/1.73 SQ.M.
GLUCOSE: 95 MG/DL (ref 70–99)
GLUCOSE: NEGATIVE MG/DL
HBA1C MFR BLD: 5.6 % (ref 4.8–5.6)
HCT VFR BLD CALC: 48.9 % (ref 35.1–48.3)
HDLC SERPL-MCNC: 58 MG/DL
HEMOGLOBIN: 15.5 G/DL (ref 11.7–16.1)
HYALINE CASTS: ABNORMAL /LPF (ref 0–2)
ICTOTEST - UA HGH: NEGATIVE
KETONES, URINE: ABNORMAL MG/DL
LDL CHOLESTEROL CALCULATED: 131 MG/DL (ref 50–99)
LDL/HDL RATIO: 2.3
LEUKOCYTE ESTERASE, URINE: ABNORMAL
MCH RBC QN AUTO: 29 PG (ref 26–34)
MCHC RBC AUTO-ENTMCNC: 32 G/DL (ref 31–36)
MCV RBC AUTO: 91 FL (ref 80–99)
NITRITE, URINE: NEGATIVE
NON-HDL CHOLESTEROL: 147 MG/DL
PDW BLD-RTO: 13.9 % (ref 10–15.5)
PH, URINE: 5.5 PH (ref 5–8)
PLATELET # BLD: 216 K/UL (ref 140–440)
PMV BLD AUTO: 12.6 FL (ref 9–13)
POTASSIUM SERPL-SCNC: 4.3 MMOL/L (ref 3.5–5.5)
PROTEIN UA: NEGATIVE MG/DL
RBC URINE: ABNORMAL /HPF
RBC: 5.35 M/UL (ref 3.8–5.2)
SODIUM BLD-SCNC: 144 MMOL/L (ref 133–145)
SPECIFIC GRAVITY: 1.02 (ref 1–1.03)
TOTAL PROTEIN: 7.8 G/DL (ref 6.2–8.1)
TRIGL SERPL-MCNC: 80 MG/DL (ref 40–149)
TSH SERPL DL<=0.05 MIU/L-ACNC: 1.59 MCU/ML (ref 0.27–4.2)
UROBILINOGEN: 1 MG/DL
VLDLC SERPL CALC-MCNC: 16 MG/DL (ref 8–30)
WBC UA: ABNORMAL /HPF (ref 0–5)
WBC: 9.1 K/UL (ref 4–11)

## 2024-04-23 ENCOUNTER — OFFICE VISIT (OUTPATIENT)
Facility: CLINIC | Age: 79
End: 2024-04-23
Payer: MEDICARE

## 2024-04-23 VITALS
TEMPERATURE: 96.4 F | BODY MASS INDEX: 32.02 KG/M2 | RESPIRATION RATE: 12 BRPM | DIASTOLIC BLOOD PRESSURE: 80 MMHG | HEART RATE: 74 BPM | OXYGEN SATURATION: 99 % | SYSTOLIC BLOOD PRESSURE: 126 MMHG | HEIGHT: 66 IN | WEIGHT: 199.2 LBS
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
## 2024-04-23 DIAGNOSIS — M54.32 BILATERAL SCIATICA: ICD-10-CM

## 2024-04-23 DIAGNOSIS — M54.31 BILATERAL SCIATICA: ICD-10-CM

## 2024-04-23 DIAGNOSIS — I10 ESSENTIAL HYPERTENSION: ICD-10-CM

## 2024-04-23 DIAGNOSIS — G62.9 NEUROPATHY: ICD-10-CM

## 2024-04-23 DIAGNOSIS — M54.2 CERVICAL PAIN (NECK): ICD-10-CM

## 2024-04-23 DIAGNOSIS — H93.19 TINNITUS, UNSPECIFIED LATERALITY: Primary | ICD-10-CM

## 2024-04-23 PROCEDURE — 3074F SYST BP LT 130 MM HG: CPT | Performed by: STUDENT IN AN ORGANIZED HEALTH CARE EDUCATION/TRAINING PROGRAM

## 2024-04-23 PROCEDURE — 1123F ACP DISCUSS/DSCN MKR DOCD: CPT | Performed by: STUDENT IN AN ORGANIZED HEALTH CARE EDUCATION/TRAINING PROGRAM

## 2024-04-23 PROCEDURE — 99214 OFFICE O/P EST MOD 30 MIN: CPT | Performed by: STUDENT IN AN ORGANIZED HEALTH CARE EDUCATION/TRAINING PROGRAM

## 2024-04-23 PROCEDURE — 3079F DIAST BP 80-89 MM HG: CPT | Performed by: STUDENT IN AN ORGANIZED HEALTH CARE EDUCATION/TRAINING PROGRAM

## 2024-04-23 RX ORDER — GABAPENTIN 100 MG/1
100 CAPSULE ORAL 3 TIMES DAILY
Qty: 90 CAPSULE | Refills: 2 | Status: SHIPPED | OUTPATIENT
Start: 2024-04-23 | End: 2024-07-22

## 2024-04-23 ASSESSMENT — ENCOUNTER SYMPTOMS
BACK PAIN: 0
EYE DISCHARGE: 0
FACIAL SWELLING: 0
CONSTIPATION: 0
ABDOMINAL PAIN: 0
VOMITING: 0
EYE REDNESS: 0
EYE PAIN: 0
DIARRHEA: 0
EYE ITCHING: 0
COLOR CHANGE: 0
SHORTNESS OF BREATH: 0

## 2024-04-23 NOTE — PROGRESS NOTES
Irasema Pineda is a 78 y.o.  female and presents with    Chief Complaint   Patient presents with    Discuss Medications           Subjective:    She had tinnitus, but now she ha been experiencing crickets and screaming noises. She feels like she is in a forest and hears screams.     She adjusted he gabapentin to take 1 pill TID. This helped with her pain. She feels like now she can deal with the pain.     Hypertension follow up:  Taking medications as prescribed: YES  Checking BP at home: YES  Symptoms: no symptoms  Low sodium diet: YES  Exercise: YES    She has been wearing a back brace, and with the gabapentin that has been helping her back. She has not seen orthopedist. When she started the gabapentin 200mg TID it was too much for her, so she brought it down to 100mg TID    Patient Active Problem List   Diagnosis    Diet-controlled hyperlipidemia    CKD (chronic kidney disease) stage 3, GFR 30-59 ml/min ([facility])    Generalized anxiety disorder    Essential hypertension    Prediabetes    Osteopenia    Obesity, Class I, BMI 30-34.9    Sciatica of left side    Pain of left hip joint      Past Medical History:   Diagnosis Date    Acute midline low back pain with left-sided sciatica 10/5/2016    Benign essential HTN 12/30/2015    Chronic kidney disease     CKD (chronic kidney disease), stage III ([facility]) 12/30/2015    Elevated hemoglobin A1c 11/9/2016    Headache     Kidney disease, chronic, stage III (GFR 30-59 ml/min) ([facility]) 11/9/2016    Memory loss 1/13/2016    Menopause     Obesity, Class I, BMI 30-34.9 12/30/2015    Osteopenia 10/19/2016    Pain of left hip joint 10/5/2016    Post herpetic neuralgia 12/30/2015    Prediabetes 8/23/2017    Sciatica of left side 12/30/2015    White coat syndrome with hypertension 8/23/2017      Past Surgical History:   Procedure Laterality Date    APPENDECTOMY      CHOLECYSTOENTEROSTOMY      EYE SURGERY  12/07/2022    HYSTERECTOMY (CERVIX STATUS UNKNOWN)      precancerous 
FIT/ Cologuard? Not due     If the patient is female:    4. For patients aged 40-74: Has the patient had a mammogram within the past 2 years? Not due    5. For patients aged 21-65: Has the patient had a pap smear? Not due     Health Maintenance: reviewed and discussed and ordered per Provider.    Health Maintenance Due   Topic Date Due    Shingles vaccine (1 of 2) Never done    Respiratory Syncytial Virus (RSV) Pregnant or age 60 yrs+ (1 - 1-dose 60+ series) Never done    COVID-19 Vaccine (5 - 2023-24 season) 09/01/2023        -TONYA Vazquez [facility]  Phone: [phone]  Fax: [phone]

## 2025-01-14 DIAGNOSIS — I10 ESSENTIAL (PRIMARY) HYPERTENSION: ICD-10-CM

## 2025-01-14 RX ORDER — LOSARTAN POTASSIUM 100 MG/1
100 TABLET ORAL DAILY
Qty: 90 TABLET | Refills: 0 | Status: SHIPPED | OUTPATIENT
Start: 2025-01-14

## 2025-01-14 NOTE — TELEPHONE ENCOUNTER
Medication(s) requesting:   Requested Prescriptions     Pending Prescriptions Disp Refills    losartan (COZAAR) 100 MG tablet [Pharmacy Med Name: Losartan Potassium 100 MG Oral Tablet] 90 tablet 0     Sig: Take 1 tablet by mouth once daily       Last office visit:  4/23/2024  Next office visit DMA: Visit date not found

## 2025-04-15 DIAGNOSIS — I10 ESSENTIAL (PRIMARY) HYPERTENSION: ICD-10-CM

## 2025-04-15 NOTE — TELEPHONE ENCOUNTER
Medication(s) requesting:   Requested Prescriptions     Pending Prescriptions Disp Refills    losartan (COZAAR) 100 MG tablet [Pharmacy Med Name: Losartan Potassium 100 MG Oral Tablet] 90 tablet 0     Sig: Take 1 tablet by mouth once daily        Last Office Visit: 04/23/2024  Next Office Visit: NONE

## 2025-04-17 ENCOUNTER — TELEPHONE (OUTPATIENT)
Facility: CLINIC | Age: 80
End: 2025-04-17

## 2025-04-17 RX ORDER — LOSARTAN POTASSIUM 100 MG/1
100 TABLET ORAL DAILY
Qty: 90 TABLET | Refills: 0 | Status: SHIPPED | OUTPATIENT
Start: 2025-04-17

## 2025-04-17 NOTE — TELEPHONE ENCOUNTER
Called pt to schedule appt for med eval unable to refill medication until she is seen       Thank you

## 2025-06-28 SDOH — ECONOMIC STABILITY: TRANSPORTATION INSECURITY
IN THE PAST 12 MONTHS, HAS LACK OF TRANSPORTATION KEPT YOU FROM MEETINGS, WORK, OR FROM GETTING THINGS NEEDED FOR DAILY LIVING?: NO

## 2025-06-28 SDOH — HEALTH STABILITY: PHYSICAL HEALTH: ON AVERAGE, HOW MANY DAYS PER WEEK DO YOU ENGAGE IN MODERATE TO STRENUOUS EXERCISE (LIKE A BRISK WALK)?: 7 DAYS

## 2025-06-28 SDOH — HEALTH STABILITY: PHYSICAL HEALTH: ON AVERAGE, HOW MANY MINUTES DO YOU ENGAGE IN EXERCISE AT THIS LEVEL?: 70 MIN

## 2025-06-28 SDOH — ECONOMIC STABILITY: INCOME INSECURITY: IN THE LAST 12 MONTHS, WAS THERE A TIME WHEN YOU WERE NOT ABLE TO PAY THE MORTGAGE OR RENT ON TIME?: NO

## 2025-06-28 SDOH — ECONOMIC STABILITY: TRANSPORTATION INSECURITY
IN THE PAST 12 MONTHS, HAS THE LACK OF TRANSPORTATION KEPT YOU FROM MEDICAL APPOINTMENTS OR FROM GETTING MEDICATIONS?: YES

## 2025-06-28 SDOH — ECONOMIC STABILITY: FOOD INSECURITY: WITHIN THE PAST 12 MONTHS, THE FOOD YOU BOUGHT JUST DIDN'T LAST AND YOU DIDN'T HAVE MONEY TO GET MORE.: NEVER TRUE

## 2025-06-28 SDOH — ECONOMIC STABILITY: FOOD INSECURITY: WITHIN THE PAST 12 MONTHS, YOU WORRIED THAT YOUR FOOD WOULD RUN OUT BEFORE YOU GOT MONEY TO BUY MORE.: SOMETIMES TRUE

## 2025-06-28 ASSESSMENT — PATIENT HEALTH QUESTIONNAIRE - PHQ9
SUM OF ALL RESPONSES TO PHQ QUESTIONS 1-9: 1
2. FEELING DOWN, DEPRESSED OR HOPELESS: NOT AT ALL
SUM OF ALL RESPONSES TO PHQ QUESTIONS 1-9: 1
1. LITTLE INTEREST OR PLEASURE IN DOING THINGS: SEVERAL DAYS
SUM OF ALL RESPONSES TO PHQ QUESTIONS 1-9: 1
SUM OF ALL RESPONSES TO PHQ QUESTIONS 1-9: 1

## 2025-06-28 ASSESSMENT — LIFESTYLE VARIABLES
HOW OFTEN DO YOU HAVE A DRINK CONTAINING ALCOHOL: MONTHLY OR LESS
HOW OFTEN DO YOU HAVE SIX OR MORE DRINKS ON ONE OCCASION: 1
HOW OFTEN DO YOU HAVE A DRINK CONTAINING ALCOHOL: 2
HOW MANY STANDARD DRINKS CONTAINING ALCOHOL DO YOU HAVE ON A TYPICAL DAY: 1 OR 2
HOW MANY STANDARD DRINKS CONTAINING ALCOHOL DO YOU HAVE ON A TYPICAL DAY: 1

## 2025-07-01 ENCOUNTER — OFFICE VISIT (OUTPATIENT)
Facility: CLINIC | Age: 80
End: 2025-07-01
Payer: MEDICARE

## 2025-07-01 VITALS
DIASTOLIC BLOOD PRESSURE: 88 MMHG | HEART RATE: 69 BPM | WEIGHT: 189.2 LBS | HEIGHT: 66 IN | BODY MASS INDEX: 30.41 KG/M2 | OXYGEN SATURATION: 96 % | TEMPERATURE: 97.3 F | RESPIRATION RATE: 16 BRPM | SYSTOLIC BLOOD PRESSURE: 160 MMHG

## 2025-07-01 DIAGNOSIS — M25.552 BILATERAL HIP PAIN: ICD-10-CM

## 2025-07-01 DIAGNOSIS — R26.89 BALANCE PROBLEM: ICD-10-CM

## 2025-07-01 DIAGNOSIS — M25.551 BILATERAL HIP PAIN: ICD-10-CM

## 2025-07-01 DIAGNOSIS — I10 ESSENTIAL HYPERTENSION: ICD-10-CM

## 2025-07-01 DIAGNOSIS — Z00.00 MEDICARE ANNUAL WELLNESS VISIT, SUBSEQUENT: Primary | ICD-10-CM

## 2025-07-01 DIAGNOSIS — M54.2 CERVICAL PAIN (NECK): ICD-10-CM

## 2025-07-01 DIAGNOSIS — R41.89 COGNITIVE DEFICITS: ICD-10-CM

## 2025-07-01 DIAGNOSIS — Z91.81 AT MAXIMUM RISK FOR FALL: ICD-10-CM

## 2025-07-01 DIAGNOSIS — Z00.00 ENCOUNTER FOR ROUTINE ADULT MEDICAL EXAMINATION: ICD-10-CM

## 2025-07-01 DIAGNOSIS — Z12.31 BREAST CANCER SCREENING BY MAMMOGRAM: ICD-10-CM

## 2025-07-01 DIAGNOSIS — N18.32 CHRONIC KIDNEY DISEASE, STAGE 3B (HCC): ICD-10-CM

## 2025-07-01 PROCEDURE — 1123F ACP DISCUSS/DSCN MKR DOCD: CPT | Performed by: STUDENT IN AN ORGANIZED HEALTH CARE EDUCATION/TRAINING PROGRAM

## 2025-07-01 PROCEDURE — 1125F AMNT PAIN NOTED PAIN PRSNT: CPT | Performed by: STUDENT IN AN ORGANIZED HEALTH CARE EDUCATION/TRAINING PROGRAM

## 2025-07-01 PROCEDURE — 3077F SYST BP >= 140 MM HG: CPT | Performed by: STUDENT IN AN ORGANIZED HEALTH CARE EDUCATION/TRAINING PROGRAM

## 2025-07-01 PROCEDURE — 1159F MED LIST DOCD IN RCRD: CPT | Performed by: STUDENT IN AN ORGANIZED HEALTH CARE EDUCATION/TRAINING PROGRAM

## 2025-07-01 PROCEDURE — 3079F DIAST BP 80-89 MM HG: CPT | Performed by: STUDENT IN AN ORGANIZED HEALTH CARE EDUCATION/TRAINING PROGRAM

## 2025-07-01 PROCEDURE — G0439 PPPS, SUBSEQ VISIT: HCPCS | Performed by: STUDENT IN AN ORGANIZED HEALTH CARE EDUCATION/TRAINING PROGRAM

## 2025-07-01 PROCEDURE — 99215 OFFICE O/P EST HI 40 MIN: CPT | Performed by: STUDENT IN AN ORGANIZED HEALTH CARE EDUCATION/TRAINING PROGRAM

## 2025-07-01 RX ORDER — PREGABALIN 50 MG/1
50 CAPSULE ORAL 3 TIMES DAILY
Qty: 90 CAPSULE | Refills: 3 | Status: SHIPPED | OUTPATIENT
Start: 2025-07-01 | End: 2025-10-29

## 2025-07-01 SDOH — ECONOMIC STABILITY: FOOD INSECURITY: WITHIN THE PAST 12 MONTHS, THE FOOD YOU BOUGHT JUST DIDN'T LAST AND YOU DIDN'T HAVE MONEY TO GET MORE.: NEVER TRUE

## 2025-07-01 SDOH — ECONOMIC STABILITY: FOOD INSECURITY: WITHIN THE PAST 12 MONTHS, YOU WORRIED THAT YOUR FOOD WOULD RUN OUT BEFORE YOU GOT MONEY TO BUY MORE.: NEVER TRUE

## 2025-07-01 ASSESSMENT — ENCOUNTER SYMPTOMS
EYE PAIN: 0
BACK PAIN: 0
EYE REDNESS: 0
COLOR CHANGE: 0
FACIAL SWELLING: 0
ABDOMINAL PAIN: 0
EYE ITCHING: 0
CONSTIPATION: 0
EYE DISCHARGE: 0
DIARRHEA: 0
SHORTNESS OF BREATH: 0
VOMITING: 0

## 2025-07-01 ASSESSMENT — VISUAL ACUITY
OD_CC: 20/40
OS_CC: 20/40

## 2025-07-01 NOTE — PROGRESS NOTES
Medicare Annual Wellness Visit    Irasema Pineda is here for Medicare AWV and Numbness (Left leg )    Assessment & Plan   Medicare annual wellness visit, subsequent     No follow-ups on file.     Subjective       Patient's complete Health Risk Assessment and screening values have been reviewed and are found in Flowsheets. The following problems were reviewed today and where indicated follow up appointments were made and/or referrals ordered.    Positive Risk Factor Screenings with Interventions:    Fall Risk:  Do you feel unsteady or are you worried about falling? : (!) (Patient-Rptd) yes  2 or more falls in past year?: (!) (Patient-Rptd) yes  Fall with injury in past year?: (Patient-Rptd) no     Interventions:    Reviewed medications, home hazards, visual acuity, and co-morbidities that can increase risk for falls  Patient advised to follow-up in this office for further evaluation and treatment  Recommended Assisted Device  Referral: Physical Therapy (PT)            General HRA Questions:  Select all that apply: (!) (Patient-Rptd) New or Increased Pain, Stress, Anger  Interventions - Pain:  See AVS for additional education material  Interventions - Stress:  Patient comments: feels like she is stressed over her financial future  Interventions - Anger:  Patient comments: Pt feels like she has always pretty quick to anger. That is why she does not like being around children.         Abnormal BMI (obese):  Body mass index is 30.54 kg/m². (!) Abnormal  Interventions:  Patient comments: She tries to work out but has issues d/t the body pains      Dentist Screen:  Have you seen the dentist within the past year?: (!) (Patient-Rptd) No    Intervention:  Advised to schedule with their dentist     Vision Screen:  Visual Acuity screen is abnormal due to a score of 20/25 or worse.  Do you have difficulty driving, watching TV, or doing any of your daily activities because of your eyesight?: (!) (Patient-Rptd) Yes  Have you had an eye

## 2025-07-01 NOTE — PROGRESS NOTES
Irasema Pineda is a 79 y.o. year old female who presents today for   Chief Complaint   Patient presents with    Medicare AWV        \"Have you been to the ER, urgent care clinic since your last visit?  Hospitalized since your last visit?\"   NO     “Have you seen or consulted any other health care providers outside our system since your last visit?”   NO             - EUN Vides  [facility]  Phone: [phone]  Fax: [phone]

## 2025-07-18 DIAGNOSIS — I10 ESSENTIAL (PRIMARY) HYPERTENSION: ICD-10-CM

## 2025-07-18 NOTE — TELEPHONE ENCOUNTER
Ms. Pineda is requesting refills of:    Requested Prescriptions     Pending Prescriptions Disp Refills    losartan (COZAAR) 100 MG tablet [Pharmacy Med Name: Losartan Potassium 100 MG Oral Tablet] 90 tablet 0     Sig: Take 1 tablet by mouth once daily       to be sent to   [address] -  [phone] -  [phone]  [address] [place] [id number]  Phone: [phone] Fax: [phone]  .     LAST OFFICE VISIT:  7/1/2025     UPCOMING APPOINTMENT(S):  Future Appointments   Date Time Provider Department Center   11/3/2025  2:00 PM OSWALDO, LAB [facility] DEP   12/8/2025  2:00 PM Senia Lacey MD [facility] DEP         Provided notified

## 2025-07-21 RX ORDER — LOSARTAN POTASSIUM 100 MG/1
100 TABLET ORAL DAILY
Qty: 90 TABLET | Refills: 1 | Status: SHIPPED | OUTPATIENT
Start: 2025-07-21